# Patient Record
Sex: FEMALE | Race: WHITE | Employment: UNEMPLOYED | URBAN - METROPOLITAN AREA
[De-identification: names, ages, dates, MRNs, and addresses within clinical notes are randomized per-mention and may not be internally consistent; named-entity substitution may affect disease eponyms.]

---

## 2017-01-09 ENCOUNTER — HOSPITAL ENCOUNTER (OUTPATIENT)
Dept: MAMMOGRAPHY | Age: 47
Discharge: HOME | End: 2017-01-09
Attending: FAMILY MEDICINE
Payer: COMMERCIAL

## 2017-01-09 DIAGNOSIS — Z12.31 ENCOUNTER FOR SCREENING MAMMOGRAM FOR BREAST CANCER: ICD-10-CM

## 2017-01-09 PROCEDURE — 77067 SCR MAMMO BI INCL CAD: CPT

## 2017-01-16 ENCOUNTER — OFFICE VISIT (OUTPATIENT)
Dept: FAMILY MEDICINE CLINIC | Facility: CLINIC | Age: 47
End: 2017-01-16

## 2017-01-16 VITALS
BODY MASS INDEX: 34.31 KG/M2 | TEMPERATURE: 97 F | HEIGHT: 64 IN | WEIGHT: 201 LBS | RESPIRATION RATE: 18 BRPM | HEART RATE: 88 BPM | DIASTOLIC BLOOD PRESSURE: 70 MMHG | SYSTOLIC BLOOD PRESSURE: 110 MMHG

## 2017-01-16 DIAGNOSIS — E55.9 VITAMIN D DEFICIENCY: ICD-10-CM

## 2017-01-16 DIAGNOSIS — Z98.84 GASTRIC BYPASS STATUS FOR OBESITY: Primary | ICD-10-CM

## 2017-01-16 DIAGNOSIS — E03.9 HYPOTHYROIDISM, UNSPECIFIED TYPE: ICD-10-CM

## 2017-01-16 DIAGNOSIS — E78.5 HYPERLIPIDEMIA, UNSPECIFIED HYPERLIPIDEMIA TYPE: ICD-10-CM

## 2017-01-16 DIAGNOSIS — E11.9 CONTROLLED TYPE 2 DIABETES MELLITUS WITHOUT COMPLICATION, WITHOUT LONG-TERM CURRENT USE OF INSULIN (HCC): ICD-10-CM

## 2017-01-16 LAB
25-HYDROXYVITAMIN D (TOTAL): 28.5 NG/ML (ref 30–100)
ALBUMIN SERPL-MCNC: 4.1 G/DL (ref 3.5–4.8)
ALP LIVER SERPL-CCNC: 96 U/L (ref 37–98)
ALT SERPL-CCNC: 28 U/L (ref 14–54)
AST SERPL-CCNC: 20 U/L (ref 15–41)
BILIRUB SERPL-MCNC: 0.4 MG/DL (ref 0.1–2)
BUN BLD-MCNC: 17 MG/DL (ref 8–20)
CALCIUM BLD-MCNC: 9.3 MG/DL (ref 8.3–10.3)
CHLORIDE: 106 MMOL/L (ref 101–111)
CHOLEST SMN-MCNC: 163 MG/DL (ref ?–200)
CO2: 30 MMOL/L (ref 22–32)
CREAT BLD-MCNC: 0.8 MG/DL (ref 0.55–1.02)
EST. AVERAGE GLUCOSE BLD GHB EST-MCNC: 120 MG/DL (ref 68–126)
FREE T4: 1.2 NG/DL (ref 0.9–1.8)
GLUCOSE BLD-MCNC: 116 MG/DL (ref 70–99)
HAV AB SERPL IA-ACNC: 524 PG/ML (ref 193–986)
HBA1C MFR BLD HPLC: 5.8 % (ref ?–5.7)
HDLC SERPL-MCNC: 51 MG/DL (ref 45–?)
HDLC SERPL: 3.2 {RATIO} (ref ?–4.44)
HEMOLYSIS: 1
ICTERUS: 1
LDLC SERPL CALC-MCNC: 87 MG/DL (ref ?–130)
LIPEMIA: 1
M PROTEIN MFR SERPL ELPH: 7.6 G/DL (ref 6.1–8.3)
NONHDLC SERPL-MCNC: 112 MG/DL (ref ?–130)
POTASSIUM SERPL-SCNC: 4.5 MMOL/L (ref 3.6–5.1)
SODIUM SERPL-SCNC: 142 MMOL/L (ref 136–144)
TRIGLYCERIDES: 123 MG/DL (ref ?–150)
TSI SER-ACNC: 0.61 MIU/ML (ref 0.35–5.5)
VLDL: 25 MG/DL (ref 5–40)

## 2017-01-16 PROCEDURE — 80053 COMPREHEN METABOLIC PANEL: CPT | Performed by: FAMILY MEDICINE

## 2017-01-16 PROCEDURE — 83036 HEMOGLOBIN GLYCOSYLATED A1C: CPT | Performed by: FAMILY MEDICINE

## 2017-01-16 PROCEDURE — 82306 VITAMIN D 25 HYDROXY: CPT | Performed by: FAMILY MEDICINE

## 2017-01-16 PROCEDURE — 80061 LIPID PANEL: CPT | Performed by: FAMILY MEDICINE

## 2017-01-16 PROCEDURE — 84443 ASSAY THYROID STIM HORMONE: CPT | Performed by: FAMILY MEDICINE

## 2017-01-16 PROCEDURE — 82607 VITAMIN B-12: CPT | Performed by: FAMILY MEDICINE

## 2017-01-16 PROCEDURE — 36415 COLL VENOUS BLD VENIPUNCTURE: CPT | Performed by: FAMILY MEDICINE

## 2017-01-16 PROCEDURE — 99214 OFFICE O/P EST MOD 30 MIN: CPT | Performed by: FAMILY MEDICINE

## 2017-01-16 PROCEDURE — 84439 ASSAY OF FREE THYROXINE: CPT | Performed by: FAMILY MEDICINE

## 2017-01-16 NOTE — PROGRESS NOTES
Lenore Mcgee is a 40year old female. CC:  No chief complaint on file. HPI:  F/u gastric bypass.  She has lost 50 lbs in the past 2 months    Follow up hypothyroid:  Energy: good  Skin changes: no  Palpitations: no  Weight changes:  yes as above mellitus, type 2 ([facility] [address]      type 2, oral meds          Past Surgical History    CHOLECYSTECTOMY      HYSTERECTOMY      Comment MACY-BSO '02--benign    REMOVAL OF KIDNEY STONE      OTHER SURGICAL HISTORY      Comment bladder suspension, R wrist ganglion joy normal nose, pharynx and TM's  NECK: No lymphadenopathy, thyromegaly or masses  CAR: S1, S2 normal, RRR; no S3, no S4; no click; murmur negative  PULM: clear to auscultation B, no accessory muscle use  GI: normal active BS+, soft, nondistended; no HSM; no

## 2017-01-17 ENCOUNTER — TELEPHONE (OUTPATIENT)
Dept: FAMILY MEDICINE CLINIC | Facility: CLINIC | Age: 47
End: 2017-01-17

## 2017-01-21 RX ORDER — LEVOTHYROXINE SODIUM 0.2 MG/1
200 TABLET ORAL
Qty: 90 TABLET | Refills: 1 | Status: SHIPPED | OUTPATIENT
Start: 2017-01-21 | End: 2017-05-20

## 2017-02-13 ENCOUNTER — MED REC SCAN ONLY (OUTPATIENT)
Dept: FAMILY MEDICINE CLINIC | Facility: CLINIC | Age: 47
End: 2017-02-13

## 2017-03-24 RX ORDER — TRAMADOL HYDROCHLORIDE 50 MG/1
TABLET ORAL
Qty: 45 TABLET | Refills: 0 | Status: SHIPPED | OUTPATIENT
Start: 2017-03-24 | End: 2017-05-20

## 2017-03-24 RX ORDER — TRAMADOL HYDROCHLORIDE 50 MG/1
TABLET ORAL
Qty: 45 TABLET | Refills: 0 | Status: CANCELLED | OUTPATIENT
Start: 2017-03-24

## 2017-03-24 NOTE — TELEPHONE ENCOUNTER
Last OV 1/16/17  Last refilled 12/27/16  #45  0 refills    Routed to Dr Nidia Choi as covering provider

## 2017-03-24 NOTE — TELEPHONE ENCOUNTER
From: Marisol Hinton  To:  Zarina Tran DO  Sent: 3/24/2017 2:19 PM CDT  Subject: Medication Renewal Request    Original authorizing provider: DO Marisol Pennington would like a refill of the following medications:  TRAMADOL HCL 50 MG Oral Ta

## 2017-05-01 ENCOUNTER — OFFICE VISIT (OUTPATIENT)
Dept: FAMILY MEDICINE CLINIC | Facility: CLINIC | Age: 47
End: 2017-05-01

## 2017-05-01 VITALS
RESPIRATION RATE: 14 BRPM | WEIGHT: 182.19 LBS | SYSTOLIC BLOOD PRESSURE: 98 MMHG | TEMPERATURE: 97 F | BODY MASS INDEX: 31 KG/M2 | HEART RATE: 66 BPM | DIASTOLIC BLOOD PRESSURE: 70 MMHG

## 2017-05-01 DIAGNOSIS — E55.9 VITAMIN D DEFICIENCY: ICD-10-CM

## 2017-05-01 DIAGNOSIS — Z00.00 WELL ADULT EXAM: Primary | ICD-10-CM

## 2017-05-01 DIAGNOSIS — E66.9 OBESITY, UNSPECIFIED OBESITY SEVERITY, UNSPECIFIED OBESITY TYPE: ICD-10-CM

## 2017-05-01 PROCEDURE — 82306 VITAMIN D 25 HYDROXY: CPT | Performed by: FAMILY MEDICINE

## 2017-05-01 PROCEDURE — 36415 COLL VENOUS BLD VENIPUNCTURE: CPT | Performed by: FAMILY MEDICINE

## 2017-05-01 PROCEDURE — 99396 PREV VISIT EST AGE 40-64: CPT | Performed by: FAMILY MEDICINE

## 2017-05-01 PROCEDURE — 82607 VITAMIN B-12: CPT | Performed by: FAMILY MEDICINE

## 2017-05-01 PROCEDURE — 83036 HEMOGLOBIN GLYCOSYLATED A1C: CPT | Performed by: FAMILY MEDICINE

## 2017-05-01 PROCEDURE — 84590 ASSAY OF VITAMIN A: CPT | Performed by: FAMILY MEDICINE

## 2017-05-01 PROCEDURE — 84439 ASSAY OF FREE THYROXINE: CPT | Performed by: FAMILY MEDICINE

## 2017-05-01 PROCEDURE — 83921 ORGANIC ACID SINGLE QUANT: CPT | Performed by: FAMILY MEDICINE

## 2017-05-01 PROCEDURE — 84630 ASSAY OF ZINC: CPT | Performed by: FAMILY MEDICINE

## 2017-05-01 PROCEDURE — 80050 GENERAL HEALTH PANEL: CPT | Performed by: FAMILY MEDICINE

## 2017-05-01 PROCEDURE — 80061 LIPID PANEL: CPT | Performed by: FAMILY MEDICINE

## 2017-05-01 PROCEDURE — 82728 ASSAY OF FERRITIN: CPT | Performed by: FAMILY MEDICINE

## 2017-05-01 NOTE — PROGRESS NOTES
Santino Chamberlain is a 40year old female.     CC:  Patient presents with:  Physical      HPI:  Patient is here for yearly, wellness exam  Last Lipid: 1/17  Last colonoscopy: 2015  Last Tetanus: 4/16  Last mammo: 1/17  Last Pap: na, s/p hysterectomy    [place] [facility] HISTORY  2013    Comment left eye surgery (muscle repair)    UPPER GI ENDOSCOPY,BIOPSY N/A 3/17/2015    Comment Procedure: ESOPHAGOGASTRODUODENOSCOPY, POSSIBLE BIOPSY, POSSIBLE POLYPECTOMY 29574;  Surgeon: Kai Kingston MD;  Location: [facility] appropriate  SKIN: not examined  BREAST: not examined/not applicable  EXTREMITIES: No clubbing, cyanosis or edema  RECTAL: not examined  GENITAL: not examined  LYMPH: no supraclavicular nodes  MUSCULOSKELETAL: normal ambulation  NEURO: intact; no sensorimo

## 2017-05-05 ENCOUNTER — NURSE ONLY (OUTPATIENT)
Dept: FAMILY MEDICINE CLINIC | Facility: CLINIC | Age: 47
End: 2017-05-05

## 2017-05-05 DIAGNOSIS — E66.01 MORBID OBESITY DUE TO EXCESS CALORIES (HCC): Primary | ICD-10-CM

## 2017-05-05 PROCEDURE — 84425 ASSAY OF VITAMIN B-1: CPT | Performed by: FAMILY MEDICINE

## 2017-05-05 PROCEDURE — 36415 COLL VENOUS BLD VENIPUNCTURE: CPT | Performed by: FAMILY MEDICINE

## 2017-05-05 PROCEDURE — 85014 HEMATOCRIT: CPT | Performed by: FAMILY MEDICINE

## 2017-05-05 PROCEDURE — 82747 ASSAY OF FOLIC ACID RBC: CPT | Performed by: FAMILY MEDICINE

## 2017-05-10 ENCOUNTER — MED REC SCAN ONLY (OUTPATIENT)
Dept: FAMILY MEDICINE CLINIC | Facility: CLINIC | Age: 47
End: 2017-05-10

## 2017-05-20 RX ORDER — TRAMADOL HYDROCHLORIDE 50 MG/1
TABLET ORAL
Qty: 45 TABLET | Refills: 0 | OUTPATIENT
Start: 2017-05-20

## 2017-05-20 NOTE — TELEPHONE ENCOUNTER
From: Brandy Nelson  To: Rodney Miranda MD  Sent: 5/20/2017 9:24 AM CDT  Subject: Medication Renewal Request    Original authorizing provider: MD Brandy Jimenez would like a refill of the following medications:  Levothyroxine Sodium 200

## 2017-05-20 NOTE — TELEPHONE ENCOUNTER
From: German Dick  To: Demetrius Alvarado DO  Sent: 5/20/2017 9:25 AM CDT  Subject: Medication Renewal Request    Original authorizing provider: DO German Mcclelland would like a refill of the following medications:  TraMADol HCl 50 MG Oral

## 2017-05-20 NOTE — TELEPHONE ENCOUNTER
LRF 1/21/17 #90 with 1 refill  LOV 5/1/17      Ref Range 5/1/17  9:31 AM       Free T4 0.9-1.8 ng/dL 1.3     TSH 0.350-5.500 mIU/mL 0.127 (L)

## 2017-05-20 NOTE — TELEPHONE ENCOUNTER
From: Michael Johnson  To: Sunita Curtis DO  Sent: 5/20/2017 9:24 AM CDT  Subject: Medication Renewal Request    Original authorizing provider: Jerris Cranker, DO Everardo Seidel would like a refill of the following medications:  TraMADol HCl 50 MG Oral

## 2017-05-22 RX ORDER — LEVOTHYROXINE SODIUM 0.2 MG/1
200 TABLET ORAL
Qty: 90 TABLET | Refills: 1 | Status: SHIPPED | OUTPATIENT
Start: 2017-05-22 | End: 2017-11-04

## 2017-05-22 RX ORDER — TRAMADOL HYDROCHLORIDE 50 MG/1
TABLET ORAL
Qty: 45 TABLET | Refills: 0 | Status: SHIPPED
Start: 2017-05-22 | End: 2017-06-08

## 2017-05-22 RX ORDER — TRAMADOL HYDROCHLORIDE 50 MG/1
TABLET ORAL
Qty: 45 TABLET | Refills: 0 | OUTPATIENT
Start: 2017-05-22

## 2017-06-08 RX ORDER — TRAMADOL HYDROCHLORIDE 50 MG/1
TABLET ORAL
Qty: 45 TABLET | Refills: 0 | Status: SHIPPED
Start: 2017-06-08 | End: 2017-08-08

## 2017-06-08 NOTE — TELEPHONE ENCOUNTER
From: Barron Alfonso  To: Anthony Alexandre MD  Sent: 6/8/2017 4:27 PM CDT  Subject: Medication Renewal Request    Original authorizing provider: MD Barron Khan would like a refill of the following medications:  TraMADol HCl 50 MG Oral T

## 2017-06-15 ENCOUNTER — PATIENT MESSAGE (OUTPATIENT)
Dept: FAMILY MEDICINE CLINIC | Facility: CLINIC | Age: 47
End: 2017-06-15

## 2017-06-15 RX ORDER — BUTALBITAL, ACETAMINOPHEN AND CAFFEINE 50; 325; 40 MG/1; MG/1; MG/1
1 TABLET ORAL EVERY 6 HOURS PRN
Qty: 5 TABLET | Refills: 0 | Status: SHIPPED | OUTPATIENT
Start: 2017-06-15 | End: 2019-06-19

## 2017-06-15 NOTE — TELEPHONE ENCOUNTER
From: Terrence Mack  To: Phani Grigsby MD  Sent: 6/15/2017 4:29 PM CDT  Subject: Prescription Question    Can u please refill my bubalitol (headache medicine)? Have had a migraine for 2 days. [facility] [place]. Thank you!

## 2017-06-25 ENCOUNTER — PATIENT MESSAGE (OUTPATIENT)
Dept: FAMILY MEDICINE CLINIC | Facility: CLINIC | Age: 47
End: 2017-06-25

## 2017-06-26 NOTE — TELEPHONE ENCOUNTER
From: Santino Chamberlain  To: Beverly Guerra MD  Sent: 6/25/2017 10:10 PM CDT  Subject: Prescription Question    Hey doc! [place] vacation and i forgot my thyroid medicine at home! We won't be home til july 9th. Will i be ok without it?

## 2017-07-19 ENCOUNTER — TELEPHONE (OUTPATIENT)
Dept: FAMILY MEDICINE CLINIC | Facility: CLINIC | Age: 47
End: 2017-07-19

## 2017-07-19 NOTE — TELEPHONE ENCOUNTER
Patient is scheduled for Friday 07/28/17 at 8:30 am for blood work (Nurse Appt). Ordering MD is Dr Nahid Patricio. Patient has order and will drop it off a few days before so that we have it.  Patient states that it is the same blood work that we have drawn before f

## 2017-07-28 ENCOUNTER — NURSE ONLY (OUTPATIENT)
Dept: FAMILY MEDICINE CLINIC | Facility: CLINIC | Age: 47
End: 2017-07-28

## 2017-07-28 ENCOUNTER — PATIENT MESSAGE (OUTPATIENT)
Dept: FAMILY MEDICINE CLINIC | Facility: CLINIC | Age: 47
End: 2017-07-28

## 2017-07-28 DIAGNOSIS — E66.01 MORBID OBESITY (HCC): Primary | ICD-10-CM

## 2017-07-28 LAB
25-HYDROXYVITAMIN D (TOTAL): 27.1 NG/ML (ref 30–100)
ALBUMIN SERPL-MCNC: 3.7 G/DL (ref 3.5–4.8)
ALP LIVER SERPL-CCNC: 72 U/L (ref 39–100)
ALT SERPL-CCNC: 18 U/L (ref 14–54)
AST SERPL-CCNC: 13 U/L (ref 15–41)
BASOPHILS # BLD AUTO: 0.05 X10(3) UL (ref 0–0.1)
BASOPHILS NFR BLD AUTO: 0.6 %
BILIRUB SERPL-MCNC: 0.5 MG/DL (ref 0.1–2)
BUN BLD-MCNC: 13 MG/DL (ref 8–20)
CALCIUM BLD-MCNC: 9 MG/DL (ref 8.3–10.3)
CHLORIDE: 109 MMOL/L (ref 101–111)
CO2: 30 MMOL/L (ref 22–32)
CREAT BLD-MCNC: 0.74 MG/DL (ref 0.55–1.02)
DEPRECATED HBV CORE AB SER IA-ACNC: 172.4 NG/ML (ref 10–291)
EOSINOPHIL # BLD AUTO: 0.12 X10(3) UL (ref 0–0.3)
EOSINOPHIL NFR BLD AUTO: 1.5 %
ERYTHROCYTE [DISTWIDTH] IN BLOOD BY AUTOMATED COUNT: 13.8 % (ref 11.5–16)
GLUCOSE BLD-MCNC: 82 MG/DL (ref 70–99)
HAV AB SERPL IA-ACNC: 409 PG/ML (ref 193–986)
HCT VFR BLD AUTO: 42.7 % (ref 34–50)
HGB BLD-MCNC: 14.2 G/DL (ref 12–16)
IMMATURE GRANULOCYTE COUNT: 0.03 X10(3) UL (ref 0–1)
IMMATURE GRANULOCYTE RATIO %: 0.4 %
LYMPHOCYTES # BLD AUTO: 2.73 X10(3) UL (ref 0.9–4)
LYMPHOCYTES NFR BLD AUTO: 33.1 %
M PROTEIN MFR SERPL ELPH: 6.8 G/DL (ref 6.1–8.3)
MCH RBC QN AUTO: 30 PG (ref 27–33.2)
MCHC RBC AUTO-ENTMCNC: 33.3 G/DL (ref 31–37)
MCV RBC AUTO: 90.1 FL (ref 81–100)
MONOCYTES # BLD AUTO: 0.4 X10(3) UL (ref 0.1–0.6)
MONOCYTES NFR BLD AUTO: 4.9 %
NEUTROPHIL ABS PRELIM: 4.91 X10 (3) UL (ref 1.3–6.7)
NEUTROPHILS # BLD AUTO: 4.91 X10(3) UL (ref 1.3–6.7)
NEUTROPHILS NFR BLD AUTO: 59.5 %
PLATELET # BLD AUTO: 222 10(3)UL (ref 150–450)
POTASSIUM SERPL-SCNC: 4.2 MMOL/L (ref 3.6–5.1)
RBC # BLD AUTO: 4.74 X10(6)UL (ref 3.8–5.1)
RED CELL DISTRIBUTION WIDTH-SD: 45.6 FL (ref 35.1–46.3)
SODIUM SERPL-SCNC: 147 MMOL/L (ref 136–144)
WBC # BLD AUTO: 8.2 X10(3) UL (ref 4–13)

## 2017-07-28 PROCEDURE — 84425 ASSAY OF VITAMIN B-1: CPT | Performed by: FAMILY MEDICINE

## 2017-07-28 PROCEDURE — 84590 ASSAY OF VITAMIN A: CPT | Performed by: FAMILY MEDICINE

## 2017-07-28 PROCEDURE — 82747 ASSAY OF FOLIC ACID RBC: CPT | Performed by: FAMILY MEDICINE

## 2017-07-28 PROCEDURE — 82607 VITAMIN B-12: CPT | Performed by: FAMILY MEDICINE

## 2017-07-28 PROCEDURE — 36415 COLL VENOUS BLD VENIPUNCTURE: CPT | Performed by: FAMILY MEDICINE

## 2017-07-28 PROCEDURE — 82728 ASSAY OF FERRITIN: CPT | Performed by: FAMILY MEDICINE

## 2017-07-28 PROCEDURE — 80053 COMPREHEN METABOLIC PANEL: CPT | Performed by: FAMILY MEDICINE

## 2017-07-28 PROCEDURE — 84630 ASSAY OF ZINC: CPT | Performed by: FAMILY MEDICINE

## 2017-07-28 PROCEDURE — 85025 COMPLETE CBC W/AUTO DIFF WBC: CPT | Performed by: FAMILY MEDICINE

## 2017-07-28 PROCEDURE — 82306 VITAMIN D 25 HYDROXY: CPT | Performed by: FAMILY MEDICINE

## 2017-07-28 PROCEDURE — 83921 ORGANIC ACID SINGLE QUANT: CPT | Performed by: FAMILY MEDICINE

## 2017-07-28 NOTE — TELEPHONE ENCOUNTER
From: Yolanda Das  To: Jearlean Lefort, MD  Sent: 7/28/2017 2:59 PM CDT  Subject: Non-Urgent Medical Question    Hey doc! My sister was just diagnosed with hereditary cardiomyopathy. Is this something I need to get checked for?

## 2017-07-28 NOTE — PROGRESS NOTES
Blood work drawn for [facility]  3 purple  2 gold  2 green   1 dark blue   22 g - left antecubital   Fax results to [phone]

## 2017-07-29 LAB — HEMATOCRIT (CLIENT SUPPLIED): 35.2 %

## 2017-07-30 LAB — MMA: 0.2 UMOL/L

## 2017-07-31 LAB
INTERPRETATION VIT A, SER/PLA: NORMAL
RETINYL PALMITATE: 0.02 MG/L
VITAMIN A (RETINOL): 0.54 MG/L
VITAMIN B1 (THIAMINE), WHOLE B: 105 NMOL/L
ZINC: 91 UG/DL

## 2017-08-08 RX ORDER — TRAMADOL HYDROCHLORIDE 50 MG/1
TABLET ORAL
Qty: 45 TABLET | Refills: 1 | Status: SHIPPED
Start: 2017-08-08 | End: 2018-06-14 | Stop reason: ALTCHOICE

## 2017-08-08 NOTE — TELEPHONE ENCOUNTER
From: Kolby Mckeon  Sent: 8/8/2017 4:30 AM CDT  Subject: Medication Renewal Request    Estiven Valdez would like a refill of the following medications:  TraMADol HCl 50 MG Oral Tab Dorsey Cooks, MD]    Preferred pharmacy: [place] [phone] -

## 2017-11-03 ENCOUNTER — PATIENT MESSAGE (OUTPATIENT)
Dept: FAMILY MEDICINE CLINIC | Facility: CLINIC | Age: 47
End: 2017-11-03

## 2017-11-04 NOTE — TELEPHONE ENCOUNTER
LRF 5/22/17 #90 with 1 refill  LOV  5/1/17  Last TSH 5/2/17 0.127- return in 1 year for wellness and labs

## 2017-11-04 NOTE — TELEPHONE ENCOUNTER
From: Lamar Smiley  To: Dionna Vigil MD  Sent: 11/3/2017 7:20 PM CDT  Subject: Prescription Question    Hi doc! I [facility]. Please and thanks!

## 2017-11-06 RX ORDER — LEVOTHYROXINE SODIUM 0.2 MG/1
200 TABLET ORAL
Qty: 90 TABLET | Refills: 2 | Status: SHIPPED | OUTPATIENT
Start: 2017-11-06 | End: 2017-11-07

## 2017-11-07 ENCOUNTER — TELEPHONE (OUTPATIENT)
Dept: FAMILY MEDICINE CLINIC | Facility: CLINIC | Age: 47
End: 2017-11-07

## 2017-11-07 RX ORDER — LEVOTHYROXINE SODIUM 0.2 MG/1
200 TABLET ORAL
Qty: 90 TABLET | Refills: 2 | Status: SHIPPED | OUTPATIENT
Start: 2017-11-07 | End: 2018-04-16

## 2017-11-17 ENCOUNTER — PATIENT MESSAGE (OUTPATIENT)
Dept: FAMILY MEDICINE CLINIC | Facility: CLINIC | Age: 47
End: 2017-11-17

## 2017-12-08 ENCOUNTER — TELEPHONE (OUTPATIENT)
Dept: FAMILY MEDICINE CLINIC | Facility: CLINIC | Age: 47
End: 2017-12-08

## 2017-12-08 NOTE — TELEPHONE ENCOUNTER
PT ADV THAT SHE HAD DM EYE EXAM ABOUT A MONTH AGO AT [facility]--WILL CALL AND HAVE THEM FAX OVER EYE EXAM

## 2017-12-11 ENCOUNTER — OFFICE VISIT (OUTPATIENT)
Dept: FAMILY MEDICINE CLINIC | Facility: CLINIC | Age: 47
End: 2017-12-11

## 2017-12-11 ENCOUNTER — TELEPHONE (OUTPATIENT)
Dept: FAMILY MEDICINE CLINIC | Facility: CLINIC | Age: 47
End: 2017-12-11

## 2017-12-11 VITALS
BODY MASS INDEX: 29.19 KG/M2 | DIASTOLIC BLOOD PRESSURE: 70 MMHG | WEIGHT: 171 LBS | SYSTOLIC BLOOD PRESSURE: 102 MMHG | RESPIRATION RATE: 10 BRPM | HEIGHT: 64 IN | TEMPERATURE: 97 F | HEART RATE: 60 BPM

## 2017-12-11 DIAGNOSIS — Z12.31 ENCOUNTER FOR SCREENING MAMMOGRAM FOR BREAST CANCER: ICD-10-CM

## 2017-12-11 DIAGNOSIS — L03.116 CELLULITIS OF LEFT LOWER EXTREMITY: Primary | ICD-10-CM

## 2017-12-11 PROCEDURE — 99214 OFFICE O/P EST MOD 30 MIN: CPT | Performed by: FAMILY MEDICINE

## 2017-12-11 RX ORDER — AMOXICILLIN AND CLAVULANATE POTASSIUM 500; 125 MG/1; MG/1
TABLET, FILM COATED ORAL
Qty: 20 TABLET | Refills: 0 | Status: SHIPPED | OUTPATIENT
Start: 2017-12-11 | End: 2017-12-21

## 2017-12-11 NOTE — TELEPHONE ENCOUNTER
Left message for patient regarding appointment.    Future Appointments  Date Time Provider Alonso Stroud   12/11/2017 4:00 PM Beverly Guerra MD [facility] Elin Virgen

## 2017-12-11 NOTE — PROGRESS NOTES
Necia Koyanagi is a 56year old female. CC:  Patient presents with:  Lesion: per pt       HPI:  L upper leg with a painful, red sore for about 2 weeks. She has tried to pop it with only minimal return of purulent fluid. No fevers or malaise.   Allergie Elena Kidd MD;  Location: [facility]   Family History   Problem Relation Age of Onset   • Hypertension Mother    • Diabetes Mother    • Other[other] Damon Poncho Mother    • Cancer Mother      Lung   • Hypertension Brother    • Thyroid Disor for screening mammogram for breast cancer  Await results   - [facility] SCREENING BILAT (CPT=77067); Future      Orders for this visit:  No orders of the defined types were placed in this encounter.       [facility] SCREENING BILAT (CPT=77067)    Meds & Refills for this

## 2017-12-11 NOTE — TELEPHONE ENCOUNTER
PT ADV THAT SHE HAS BEEN GETTING LEG SORES WHERE PT HAD LOST WEIGHT AND HAS LOOSE SKIN RUBBING. PT THINKS SHE HAS A BOIL.  WOULD LIKE TO BE SEEN TODAY IF POSSIBLE    PLEASE LEAVE MSG-PT IS  W. [facility]

## 2017-12-11 NOTE — TELEPHONE ENCOUNTER
Phone call from patient. States that on  Top of left thigh has a \"sore\" where her Sandra Garland lays on her leg\". States it is the size of a quarter, reddened, warm to the touch. Raised. Has had for 1-2 weeks, but has gotten worse over the last 2 days.   Fe

## 2017-12-13 ENCOUNTER — TELEPHONE (OUTPATIENT)
Dept: FAMILY MEDICINE CLINIC | Facility: CLINIC | Age: 47
End: 2017-12-13

## 2017-12-15 ENCOUNTER — OFFICE VISIT (OUTPATIENT)
Dept: FAMILY MEDICINE CLINIC | Facility: CLINIC | Age: 47
End: 2017-12-15

## 2017-12-15 VITALS
RESPIRATION RATE: 12 BRPM | TEMPERATURE: 99 F | HEART RATE: 66 BPM | DIASTOLIC BLOOD PRESSURE: 74 MMHG | BODY MASS INDEX: 29 KG/M2 | WEIGHT: 169 LBS | SYSTOLIC BLOOD PRESSURE: 128 MMHG

## 2017-12-15 DIAGNOSIS — L03.116 CELLULITIS OF LEFT LOWER EXTREMITY: Primary | ICD-10-CM

## 2017-12-15 PROCEDURE — 99213 OFFICE O/P EST LOW 20 MIN: CPT | Performed by: FAMILY MEDICINE

## 2017-12-15 NOTE — PROGRESS NOTES
Arleen Milligan is a 56year old female. CC:  No chief complaint on file. HPI:  F/u L upper leg cellulitis. Taking Augmentin as directed. The lesions is better. It did drain a few days ago.  She has the start of similar lesions on R groin and L elb Comment: Procedure: ESOPHAGOGASTRODUODENOSCOPY,                POSSIBLE BIOPSY, POSSIBLE POLYPECTOMY 73720;                 Surgeon: Annella Homans, MD;  Location: [facility]   Family History   Problem Relation Age of Onset   • Hypertension M.D.

## 2017-12-21 ENCOUNTER — OFFICE VISIT (OUTPATIENT)
Dept: FAMILY MEDICINE CLINIC | Facility: CLINIC | Age: 47
End: 2017-12-21

## 2017-12-21 VITALS
RESPIRATION RATE: 16 BRPM | SYSTOLIC BLOOD PRESSURE: 118 MMHG | TEMPERATURE: 97 F | HEART RATE: 72 BPM | DIASTOLIC BLOOD PRESSURE: 70 MMHG | BODY MASS INDEX: 29 KG/M2 | WEIGHT: 168 LBS

## 2017-12-21 DIAGNOSIS — J06.9 VIRAL UPPER RESPIRATORY TRACT INFECTION: ICD-10-CM

## 2017-12-21 DIAGNOSIS — L03.116 CELLULITIS OF LEFT LOWER EXTREMITY: Primary | ICD-10-CM

## 2017-12-21 PROCEDURE — 99213 OFFICE O/P EST LOW 20 MIN: CPT | Performed by: FAMILY MEDICINE

## 2017-12-21 NOTE — PROGRESS NOTES
Haley Vaughn is a 56year old female. CC:  Patient presents with: Follow - Up: on sore on leg per pt      HPI:  F/u L groin region cellulitis. She is still getting some d/c from the area. She is taking the antibiotic as directed w/o issue.  Her ener shoulder SLAP repair  2013: OTHER SURGICAL HISTORY      Comment: left eye surgery (muscle repair)  No date: REMOVAL OF KIDNEY STONE  3/17/2015: UPPER GI ENDOSCOPY,BIOPSY N/A      Comment: Procedure: ESOPHAGOGASTRODUODENOSCOPY,                POSSIBLE BIOPS induration  BREAST: not examined/not applicable  EXTREMITIES: No clubbing, cyanosis or edema  RECTAL: not examined  GENITAL: not examined  LYMPH: no supraclavicular nodes  MUSCULOSKELETAL: normal ambulation  NEURO: not examined     ASSESSMENT AND PLAN    1

## 2018-01-02 ENCOUNTER — PATIENT MESSAGE (OUTPATIENT)
Dept: FAMILY MEDICINE CLINIC | Facility: CLINIC | Age: 48
End: 2018-01-02

## 2018-01-03 NOTE — TELEPHONE ENCOUNTER
From: Lola Garland  To: Pedro Holland MD  Sent: 1/2/2018 3:39 PM CST  Subject: Non-Urgent Medical Question    Hi doc! Called and scheduled Daisha Gage his stress test and me my mammogram. They asked about scheduling his MRI for his back.  Has that been [facility]

## 2018-01-24 ENCOUNTER — HOSPITAL ENCOUNTER (OUTPATIENT)
Dept: MAMMOGRAPHY | Age: 48
Discharge: HOME | End: 2018-01-24
Attending: FAMILY MEDICINE
Payer: COMMERCIAL

## 2018-01-24 DIAGNOSIS — Z12.31 ENCOUNTER FOR SCREENING MAMMOGRAM FOR BREAST CANCER: ICD-10-CM

## 2018-01-24 PROCEDURE — 77067 SCR MAMMO BI INCL CAD: CPT | Performed by: FAMILY MEDICINE

## 2018-04-16 ENCOUNTER — PATIENT MESSAGE (OUTPATIENT)
Dept: FAMILY MEDICINE CLINIC | Facility: CLINIC | Age: 48
End: 2018-04-16

## 2018-04-16 RX ORDER — LEVOTHYROXINE SODIUM 0.2 MG/1
200 TABLET ORAL
Qty: 90 TABLET | Refills: 2 | Status: SHIPPED
Start: 2018-04-16 | End: 2019-07-30

## 2018-04-16 NOTE — TELEPHONE ENCOUNTER
Last refilled on 11/7/17 for # 90 with 2 refills  Last TSH labs 0.127  Last seen on 12/21/17  No future appointments. Thank you.

## 2018-04-16 NOTE — TELEPHONE ENCOUNTER
From: Milvia Fry  To: Sarah Robin MD  Sent: 4/16/2018 2:38 PM CDT  Subject: Other    Hi doc! I went to see a plastic surgeon about my excess skin on my stomach. She needs your notes on the boil you treated on my upper thigh.  Her name is Nicole Villaseñor

## 2018-04-16 NOTE — TELEPHONE ENCOUNTER
From: Micha Barrett  Sent: 4/16/2018 2:39 PM CDT  Subject: Medication Renewal Request    Alonzo Valdez would like a refill of the following medications:     Levothyroxine Sodium 200 MCG Oral Tab Azar Bowers MD]    Preferred pharmacy: [facility]

## 2018-04-27 ENCOUNTER — TELEPHONE (OUTPATIENT)
Dept: FAMILY MEDICINE CLINIC | Facility: CLINIC | Age: 48
End: 2018-04-27

## 2018-04-27 ENCOUNTER — OFFICE VISIT (OUTPATIENT)
Dept: FAMILY MEDICINE CLINIC | Facility: CLINIC | Age: 48
End: 2018-04-27

## 2018-04-27 VITALS
DIASTOLIC BLOOD PRESSURE: 84 MMHG | HEIGHT: 64 IN | WEIGHT: 177 LBS | HEART RATE: 57 BPM | OXYGEN SATURATION: 98 % | RESPIRATION RATE: 16 BRPM | BODY MASS INDEX: 30.22 KG/M2 | SYSTOLIC BLOOD PRESSURE: 120 MMHG | TEMPERATURE: 97 F

## 2018-04-27 DIAGNOSIS — J31.0 PURULENT RHINITIS: Primary | ICD-10-CM

## 2018-04-27 DIAGNOSIS — E11.9 CONTROLLED TYPE 2 DIABETES MELLITUS WITHOUT COMPLICATION, WITHOUT LONG-TERM CURRENT USE OF INSULIN (HCC): ICD-10-CM

## 2018-04-27 DIAGNOSIS — R05.8 PRODUCTIVE COUGH: ICD-10-CM

## 2018-04-27 DIAGNOSIS — E03.9 HYPOTHYROIDISM, UNSPECIFIED TYPE: ICD-10-CM

## 2018-04-27 DIAGNOSIS — E66.9 OBESITY (BMI 30-39.9): ICD-10-CM

## 2018-04-27 PROCEDURE — 84443 ASSAY THYROID STIM HORMONE: CPT | Performed by: FAMILY MEDICINE

## 2018-04-27 PROCEDURE — 82043 UR ALBUMIN QUANTITATIVE: CPT | Performed by: FAMILY MEDICINE

## 2018-04-27 PROCEDURE — 84439 ASSAY OF FREE THYROXINE: CPT | Performed by: FAMILY MEDICINE

## 2018-04-27 PROCEDURE — 82570 ASSAY OF URINE CREATININE: CPT | Performed by: FAMILY MEDICINE

## 2018-04-27 PROCEDURE — 36415 COLL VENOUS BLD VENIPUNCTURE: CPT | Performed by: FAMILY MEDICINE

## 2018-04-27 PROCEDURE — 99214 OFFICE O/P EST MOD 30 MIN: CPT | Performed by: FAMILY MEDICINE

## 2018-04-27 PROCEDURE — 83036 HEMOGLOBIN GLYCOSYLATED A1C: CPT | Performed by: FAMILY MEDICINE

## 2018-04-27 RX ORDER — PHENTERMINE HYDROCHLORIDE 37.5 MG/1
37.5 TABLET ORAL
Qty: 30 TABLET | Refills: 1 | Status: SHIPPED
Start: 2018-04-27 | End: 2018-04-27

## 2018-04-27 RX ORDER — PHENTERMINE HYDROCHLORIDE 37.5 MG/1
37.5 TABLET ORAL
Qty: 30 TABLET | Refills: 1 | Status: SHIPPED
Start: 2018-04-27 | End: 2018-06-14 | Stop reason: ALTCHOICE

## 2018-04-27 RX ORDER — AMOXICILLIN AND CLAVULANATE POTASSIUM 500; 125 MG/1; MG/1
TABLET, FILM COATED ORAL
Qty: 20 TABLET | Refills: 0 | Status: SHIPPED | OUTPATIENT
Start: 2018-04-27 | End: 2018-05-07

## 2018-04-27 RX ORDER — CODEINE PHOSPHATE AND GUAIFENESIN 10; 100 MG/5ML; MG/5ML
5 SOLUTION ORAL 3 TIMES DAILY PRN
Qty: 100 ML | Refills: 0 | Status: SHIPPED
Start: 2018-04-27 | End: 2018-06-14 | Stop reason: ALTCHOICE

## 2018-04-27 NOTE — PROGRESS NOTES
German Dick is a 56year old female. CC:  Patient presents with:  Sinus Problem: per pt      HPI:  The patient has primary complaint of nasal congestion and rhinorrhea that is thick and green for  1 week.  Associated symptoms include facial pain   a spondylosis    • GALINA on CPAP       Past Surgical History:  No date: CHOLECYSTECTOMY  3/17/2015: COLONOSCOPY,BIOPSY N/A      Comment: Procedure: COLONOSCOPY, POSSIBLE BIOPSY,                POSSIBLE POLYPECTOMY 76238;  Surgeon: Joann Vasquez MD M.D.    Physical Exam:  GEN: well developed, well nourished, in no apparent distress  EYE: B conjunctiva and lids normal  HENT: Nasal mucosa is boggy with discolored discharge, Post nasal drip and cobblestoning present on the posterior pharynx, B pinnas, e Sig: Take 5 mL by mouth 3 (three) times daily as needed for cough. Phentermine HCl (ADIPEX-P) 37.5 MG Oral Tab 30 tablet 1      Sig: Take 1 tablet (37.5 mg total) by mouth every morning before breakfast.             No Follow-up on file.

## 2018-04-27 NOTE — TELEPHONE ENCOUNTER
Phone call to [employer]- advised that the phentermine has been approved. Approved 3 28 2018 thru 7 26 2018.   Case ID# [id number]  Rx # [id number]

## 2018-06-14 ENCOUNTER — OFFICE VISIT (OUTPATIENT)
Dept: FAMILY MEDICINE CLINIC | Facility: CLINIC | Age: 48
End: 2018-06-14

## 2018-06-14 VITALS
RESPIRATION RATE: 16 BRPM | BODY MASS INDEX: 29.4 KG/M2 | SYSTOLIC BLOOD PRESSURE: 110 MMHG | TEMPERATURE: 97 F | HEIGHT: 64 IN | DIASTOLIC BLOOD PRESSURE: 76 MMHG | WEIGHT: 172.19 LBS | HEART RATE: 72 BPM

## 2018-06-14 DIAGNOSIS — Z00.00 WELL ADULT EXAM: Primary | ICD-10-CM

## 2018-06-14 DIAGNOSIS — E03.9 HYPOTHYROIDISM, UNSPECIFIED TYPE: ICD-10-CM

## 2018-06-14 DIAGNOSIS — Z63.79 STRESS DUE TO ILLNESS OF FAMILY MEMBER: ICD-10-CM

## 2018-06-14 PROCEDURE — 99396 PREV VISIT EST AGE 40-64: CPT | Performed by: FAMILY MEDICINE

## 2018-06-14 NOTE — PROGRESS NOTES
Brandy Nelson is a 53year old female.     CC:  Patient presents with:  Physical: per pt      HPI:  Patient is here for yearly, wellness exam   Last Lipid: 5/17   Last colonoscopy: na       Immunization History   Administered            Date(s) Administe bladder suspension, R wrist ganglion removal,                L ovarian cystectomy  No date: OTHER SURGICAL HISTORY      Comment: R shoulder SLAP repair  2013: OTHER SURGICAL HISTORY      Comment: left eye surgery (muscle repair)  No date: REMOVAL OF KIDNEY masses  CAR: S1, S2 normal, RRR; no S3, no S4; no click; murmur negative  PULM: clear to auscultation B, no accessory muscle use  GI: normal active BS+, soft, nondistended; no HSM; no masses; no bruits; no masses; nontender, no G/R/R   PSYCH: alert and fatemeh

## 2018-06-20 ENCOUNTER — NURSE ONLY (OUTPATIENT)
Dept: FAMILY MEDICINE CLINIC | Facility: CLINIC | Age: 48
End: 2018-06-20

## 2018-06-20 DIAGNOSIS — E03.9 HYPOTHYROIDISM, UNSPECIFIED TYPE: ICD-10-CM

## 2018-06-20 DIAGNOSIS — Z00.00 WELL ADULT EXAM: ICD-10-CM

## 2018-06-20 PROCEDURE — 84439 ASSAY OF FREE THYROXINE: CPT | Performed by: FAMILY MEDICINE

## 2018-06-20 PROCEDURE — 80050 GENERAL HEALTH PANEL: CPT | Performed by: FAMILY MEDICINE

## 2018-06-20 PROCEDURE — 80061 LIPID PANEL: CPT | Performed by: FAMILY MEDICINE

## 2018-06-20 PROCEDURE — 36415 COLL VENOUS BLD VENIPUNCTURE: CPT | Performed by: FAMILY MEDICINE

## 2018-06-20 NOTE — PROGRESS NOTES
Antoine Cruz presents today for nurse visit. 1 light green, 1 lavender drawn from left ac area with 1 attempt with straight needle. Left office in stable condition.

## 2018-06-21 ENCOUNTER — TELEPHONE (OUTPATIENT)
Dept: FAMILY MEDICINE CLINIC | Facility: CLINIC | Age: 48
End: 2018-06-21

## 2018-06-21 RX ORDER — AZITHROMYCIN 250 MG/1
TABLET, FILM COATED ORAL
Qty: 6 TABLET | Refills: 0 | Status: SHIPPED | OUTPATIENT
Start: 2018-06-21 | End: 2018-06-26

## 2018-06-21 RX ORDER — ALBUTEROL SULFATE 90 UG/1
2 AEROSOL, METERED RESPIRATORY (INHALATION) EVERY 4 HOURS PRN
Qty: 1 INHALER | Refills: 0 | Status: SHIPPED | OUTPATIENT
Start: 2018-06-21 | End: 2019-06-19

## 2018-06-21 NOTE — TELEPHONE ENCOUNTER
Pt woke up this morning coughing up \"thick Booger crap\" She leaves for vacation later today and doesn't have time to come in and see TJ. She is wheezing a little. She would like SHANNEN to call in something so she can kick the bug fast while on vacation.  Sh

## 2018-06-21 NOTE — TELEPHONE ENCOUNTER
Call from patient. Stated last night she started to get a itchy throat, runny nose and wheezing. States she is a little short of breath, but doesn't have a fever. States she thinks this is bronchitis.  She is leaving this afternoon for [place] and doesn't

## 2018-06-21 NOTE — TELEPHONE ENCOUNTER
Fax received from [facility] stating that patient has macrolide allergy. Left message for patient to call back to advise on allergy.  Patient has been on zithromax in the past.

## 2018-06-21 NOTE — TELEPHONE ENCOUNTER
Call back from patient. States that she is not allergic to macrolides. Call to [facility] and spoke to pharmacy staff. Advised that patient states she is not allergic to macrolides. Pharmacy will fill script. Dr Sherrie Rodriguez aware.

## 2019-01-17 ENCOUNTER — PATIENT OUTREACH (OUTPATIENT)
Dept: FAMILY MEDICINE CLINIC | Facility: CLINIC | Age: 49
End: 2019-01-17

## 2019-02-11 ENCOUNTER — HOSPITAL ENCOUNTER (OUTPATIENT)
Dept: MAMMOGRAPHY | Age: 49
Discharge: HOME | End: 2019-02-11
Attending: FAMILY MEDICINE
Payer: COMMERCIAL

## 2019-02-11 DIAGNOSIS — Z12.31 BREAST CANCER SCREENING BY MAMMOGRAM: ICD-10-CM

## 2019-02-11 PROCEDURE — 77067 SCR MAMMO BI INCL CAD: CPT | Performed by: FAMILY MEDICINE

## 2019-04-05 ENCOUNTER — TELEPHONE (OUTPATIENT)
Dept: FAMILY MEDICINE CLINIC | Facility: CLINIC | Age: 49
End: 2019-04-05

## 2019-04-05 DIAGNOSIS — K90.9 INTESTINAL MALABSORPTION, UNSPECIFIED TYPE: Primary | ICD-10-CM

## 2019-04-05 NOTE — TELEPHONE ENCOUNTER
Orders entered -  Phone call to [facility] [phone]-  Xck question regarding the Iron, Transferrin with Calculated TIBC- office does not do that test. What tests would they like to have done?

## 2019-04-05 NOTE — TELEPHONE ENCOUNTER
Rashida from [facility] called the office back- she was advised that the Iron, Transferrin with Calculated TIBC can not be performed in the office. Advised that the office can do a Iron- TIBC and do the transferrin separately.  Be

## 2019-04-08 ENCOUNTER — NURSE ONLY (OUTPATIENT)
Dept: FAMILY MEDICINE CLINIC | Facility: CLINIC | Age: 49
End: 2019-04-08
Payer: COMMERCIAL

## 2019-04-08 DIAGNOSIS — K90.9 INTESTINAL MALABSORPTION, UNSPECIFIED TYPE: ICD-10-CM

## 2019-04-08 DIAGNOSIS — E78.5 HYPERLIPIDEMIA, UNSPECIFIED HYPERLIPIDEMIA TYPE: ICD-10-CM

## 2019-04-08 DIAGNOSIS — E03.9 ACQUIRED HYPOTHYROIDISM: ICD-10-CM

## 2019-04-08 PROCEDURE — 80061 LIPID PANEL: CPT | Performed by: FAMILY MEDICINE

## 2019-04-08 PROCEDURE — 82746 ASSAY OF FOLIC ACID SERUM: CPT | Performed by: FAMILY MEDICINE

## 2019-04-08 PROCEDURE — 36415 COLL VENOUS BLD VENIPUNCTURE: CPT | Performed by: FAMILY MEDICINE

## 2019-04-08 PROCEDURE — 84439 ASSAY OF FREE THYROXINE: CPT | Performed by: FAMILY MEDICINE

## 2019-04-08 PROCEDURE — 83970 ASSAY OF PARATHORMONE: CPT | Performed by: FAMILY MEDICINE

## 2019-04-08 PROCEDURE — 84100 ASSAY OF PHOSPHORUS: CPT | Performed by: FAMILY MEDICINE

## 2019-04-08 PROCEDURE — 82306 VITAMIN D 25 HYDROXY: CPT | Performed by: FAMILY MEDICINE

## 2019-04-08 PROCEDURE — 84134 ASSAY OF PREALBUMIN: CPT | Performed by: FAMILY MEDICINE

## 2019-04-08 PROCEDURE — 82607 VITAMIN B-12: CPT | Performed by: FAMILY MEDICINE

## 2019-04-08 PROCEDURE — 83540 ASSAY OF IRON: CPT | Performed by: FAMILY MEDICINE

## 2019-04-08 PROCEDURE — 84590 ASSAY OF VITAMIN A: CPT | Performed by: FAMILY MEDICINE

## 2019-04-08 PROCEDURE — 80053 COMPREHEN METABOLIC PANEL: CPT | Performed by: FAMILY MEDICINE

## 2019-04-08 PROCEDURE — 83550 IRON BINDING TEST: CPT | Performed by: FAMILY MEDICINE

## 2019-04-08 PROCEDURE — 84443 ASSAY THYROID STIM HORMONE: CPT | Performed by: FAMILY MEDICINE

## 2019-04-08 NOTE — PROGRESS NOTES
1 gold and  3 mint tubes collected from L AC using straight needle and 1 attempt    Pt tolerated and was sent home in  Stable condition    Lab results to go to bariatric doctor- see Mychart message 4/5/19

## 2019-04-09 DIAGNOSIS — K90.9 INTESTINAL MALABSORPTION, UNSPECIFIED TYPE: Primary | ICD-10-CM

## 2019-04-09 RX ORDER — SIMVASTATIN 20 MG
20 TABLET ORAL NIGHTLY
Qty: 90 TABLET | Refills: 0 | Status: SHIPPED | OUTPATIENT
Start: 2019-04-09 | End: 2019-07-03

## 2019-04-09 NOTE — PROGRESS NOTES
Results to be faxed to Dr Janice Lopez at [facility] [place] [facility] at [phone]

## 2019-04-17 ENCOUNTER — PATIENT MESSAGE (OUTPATIENT)
Dept: FAMILY MEDICINE CLINIC | Facility: CLINIC | Age: 49
End: 2019-04-17

## 2019-04-17 ENCOUNTER — TELEPHONE (OUTPATIENT)
Dept: FAMILY MEDICINE CLINIC | Facility: CLINIC | Age: 49
End: 2019-04-17

## 2019-04-17 NOTE — TELEPHONE ENCOUNTER
From: Jami Monk  To: Briana Simental MD  Sent: 4/17/2019 9:32 AM CDT  Subject: Other    Hi doc. Can you give me a call? Got a few questions and it's a bit complicated.  [phone]

## 2019-04-17 NOTE — TELEPHONE ENCOUNTER
Patient reached out to Dr. Anthony Singh via my-chart and asked for a call back. Phone call to patient.  She states that her daughter is in stage 4 kidney disease and will be starting dialysis and will then be placed on the transplant list. Patient would like t

## 2019-06-19 ENCOUNTER — OFFICE VISIT (OUTPATIENT)
Dept: FAMILY MEDICINE CLINIC | Facility: CLINIC | Age: 49
End: 2019-06-19
Payer: COMMERCIAL

## 2019-06-19 VITALS
HEIGHT: 64 IN | HEART RATE: 64 BPM | WEIGHT: 189 LBS | TEMPERATURE: 97 F | RESPIRATION RATE: 16 BRPM | BODY MASS INDEX: 32.27 KG/M2 | DIASTOLIC BLOOD PRESSURE: 80 MMHG | SYSTOLIC BLOOD PRESSURE: 120 MMHG

## 2019-06-19 DIAGNOSIS — Z00.00 WELL ADULT EXAM: Primary | ICD-10-CM

## 2019-06-19 DIAGNOSIS — E03.9 ACQUIRED HYPOTHYROIDISM: ICD-10-CM

## 2019-06-19 DIAGNOSIS — E78.5 HYPERLIPIDEMIA, UNSPECIFIED HYPERLIPIDEMIA TYPE: ICD-10-CM

## 2019-06-19 DIAGNOSIS — Z72.0 TOBACCO USE: ICD-10-CM

## 2019-06-19 PROCEDURE — 36415 COLL VENOUS BLD VENIPUNCTURE: CPT | Performed by: FAMILY MEDICINE

## 2019-06-19 PROCEDURE — 80061 LIPID PANEL: CPT | Performed by: FAMILY MEDICINE

## 2019-06-19 PROCEDURE — 99396 PREV VISIT EST AGE 40-64: CPT | Performed by: FAMILY MEDICINE

## 2019-06-19 PROCEDURE — 80053 COMPREHEN METABOLIC PANEL: CPT | Performed by: FAMILY MEDICINE

## 2019-06-19 RX ORDER — NICOTINE 21 MG/24HR
1 PATCH, TRANSDERMAL 24 HOURS TRANSDERMAL EVERY 24 HOURS
Qty: 7 PATCH | Refills: 0 | Status: SHIPPED | OUTPATIENT
Start: 2019-06-26 | End: 2019-07-03

## 2019-06-19 RX ORDER — PHENTERMINE HYDROCHLORIDE 37.5 MG/1
37.5 TABLET ORAL
COMMUNITY
Start: 2019-06-19 | End: 2020-07-15

## 2019-06-19 RX ORDER — NICOTINE 21 MG/24HR
1 PATCH, TRANSDERMAL 24 HOURS TRANSDERMAL EVERY 24 HOURS
Qty: 7 PATCH | Refills: 0 | Status: SHIPPED | OUTPATIENT
Start: 2019-06-19 | End: 2019-06-26

## 2019-06-19 NOTE — PROGRESS NOTES
Nohemi Owens is a 51year old female.     CC:  Patient presents with:  Physical: per pt      HPI:  Patient is here for yearly, wellness exam  Last Lipid:  Lab Results   Component Value Date    CHOLEST 280 (H) 04/08/2019    TRIG 198 (H) 04/08/2019    HDL due to dumping syndrome   • History of kidney stones    • History of polycystic ovarian disease    • Hyperlipidemia    • Hypothyroid    • Irritable bowel    • Lumbar spondylosis    • GALINA on CPAP       Past Surgical History:   Procedure Laterality Date stable  Cardiovascular/Pulses: Denies palpitations, tachycardia, irregular heart beat, chest pain  Respiratory: Denies cough, dyspnea, dyspnea on exertion  GI: Denies hematochezia, melena   (female): Denies hematuria    Vitals: /80   Pulse 64   Tem Status: Future          Number of Occurrences: 1          Standing Expiration Date: 6/19/2020      Comp Metabolic Panel (14) [E]          Standing Status: Future          Number of Occurrences: 1          Standing Expiration Date: 6/19/2020      *Josy

## 2019-07-03 RX ORDER — SIMVASTATIN 20 MG
20 TABLET ORAL NIGHTLY
Qty: 90 TABLET | Refills: 3 | Status: SHIPPED | OUTPATIENT
Start: 2019-07-03 | End: 2020-09-15

## 2019-07-03 NOTE — TELEPHONE ENCOUNTER
Patient was advised at original phone call that the prescription would be sent. She would be contacted if any additional questions.

## 2019-10-21 ENCOUNTER — TELEPHONE (OUTPATIENT)
Dept: FAMILY MEDICINE CLINIC | Facility: CLINIC | Age: 49
End: 2019-10-21

## 2019-10-21 ENCOUNTER — OFFICE VISIT (OUTPATIENT)
Dept: FAMILY MEDICINE CLINIC | Facility: CLINIC | Age: 49
End: 2019-10-21
Payer: COMMERCIAL

## 2019-10-21 VITALS
SYSTOLIC BLOOD PRESSURE: 130 MMHG | TEMPERATURE: 96 F | WEIGHT: 196 LBS | BODY MASS INDEX: 34 KG/M2 | DIASTOLIC BLOOD PRESSURE: 82 MMHG

## 2019-10-21 DIAGNOSIS — R09.89 ABNORMAL LUNG SOUNDS: ICD-10-CM

## 2019-10-21 DIAGNOSIS — R06.2 WHEEZING: ICD-10-CM

## 2019-10-21 DIAGNOSIS — R05.8 PRODUCTIVE COUGH: Primary | ICD-10-CM

## 2019-10-21 PROCEDURE — 99214 OFFICE O/P EST MOD 30 MIN: CPT | Performed by: FAMILY MEDICINE

## 2019-10-21 RX ORDER — PREDNISONE 20 MG/1
TABLET ORAL
Qty: 18 TABLET | Refills: 0 | Status: SHIPPED | OUTPATIENT
Start: 2019-10-21 | End: 2019-10-30

## 2019-10-21 RX ORDER — AMOXICILLIN AND CLAVULANATE POTASSIUM 500; 125 MG/1; MG/1
TABLET, FILM COATED ORAL
Qty: 20 TABLET | Refills: 0 | Status: SHIPPED | OUTPATIENT
Start: 2019-10-21 | End: 2019-10-31

## 2019-10-21 NOTE — PROGRESS NOTES
Necia Koyanagi is a 51year old female. CC:  Patient presents with:  Pneumonia      HPI:  The patient has primary complaint of productive cough for  4 days. Associated symptoms include sweats and wheezing. The patient has not taken temperatures.  There ARTHROSCOPY ROTATOR CUFF REPAIR Right 6/25/2014    Performed by Yuniel Alexander MD at [facility] MAIN OR      Family History   Problem Relation Age of Onset   • Hypertension Mother    • Diabetes Mother    • Cancer Mother         Lung   • Other (Other) Mother PLAN    1. Productive cough  Take prescribed medications as directed. Rest and push fluids    2. Wheezing  As above     3.  Abnormal lung sounds  As above     F/u in 5-7 days    Orders for this visit:    No orders of the defined types were placed in this

## 2019-10-21 NOTE — TELEPHONE ENCOUNTER
Pt called, thinks she has pneumonia and would like to be seen this morning.    Please call pt at [phone]

## 2019-10-28 ENCOUNTER — OFFICE VISIT (OUTPATIENT)
Dept: FAMILY MEDICINE CLINIC | Facility: CLINIC | Age: 49
End: 2019-10-28
Payer: COMMERCIAL

## 2019-10-28 VITALS
BODY MASS INDEX: 34 KG/M2 | SYSTOLIC BLOOD PRESSURE: 110 MMHG | DIASTOLIC BLOOD PRESSURE: 80 MMHG | HEART RATE: 64 BPM | TEMPERATURE: 97 F | RESPIRATION RATE: 12 BRPM | WEIGHT: 196.38 LBS

## 2019-10-28 DIAGNOSIS — R05.8 PRODUCTIVE COUGH: Primary | ICD-10-CM

## 2019-10-28 DIAGNOSIS — R06.2 WHEEZING: ICD-10-CM

## 2019-10-28 PROCEDURE — 99213 OFFICE O/P EST LOW 20 MIN: CPT | Performed by: FAMILY MEDICINE

## 2019-10-28 NOTE — PROGRESS NOTES
Lamar Smiley is a 51year old female. CC:  Patient presents with:  Pneumonia      HPI:  F/u Cough, wheeze. She is doing better, yet still coughing. Wheeze is better. No fevers. There is some diarrhea.   Allergies:    Darvocet [Propoxyph*    NAUSEA AN HISTORY  2013    left eye surgery (muscle repair)   • REMOVAL OF KIDNEY STONE     • SHOULDER ARTHROSCOPY ROTATOR CUFF REPAIR Right 6/25/2014    Performed by Lamar Coughlin MD at [facility] MAIN OR      Family History   Problem Relation Age of Onset   • Hyperten not examined  LYMPH: no supraclavicular nodes  MUSCULOSKELETAL: normal ambulation  NEURO: Awake and alert. Normal speech and articulation. No facial droop or asymmetry. Moving all extremities equally. ASSESSMENT AND PLAN    1.  Productive cough  Improvin

## 2019-12-26 ENCOUNTER — PATIENT MESSAGE (OUTPATIENT)
Dept: FAMILY MEDICINE CLINIC | Facility: CLINIC | Age: 49
End: 2019-12-26

## 2019-12-26 RX ORDER — AMOXICILLIN AND CLAVULANATE POTASSIUM 500; 125 MG/1; MG/1
TABLET, FILM COATED ORAL
Qty: 20 TABLET | Refills: 0 | Status: SHIPPED | OUTPATIENT
Start: 2019-12-26 | End: 2020-01-05

## 2019-12-28 RX ORDER — ALBUTEROL SULFATE 90 UG/1
2 AEROSOL, METERED RESPIRATORY (INHALATION) EVERY 4 HOURS PRN
Qty: 1 INHALER | Refills: 5 | Status: SHIPPED | OUTPATIENT
Start: 2019-12-28 | End: 2021-01-22

## 2019-12-28 NOTE — TELEPHONE ENCOUNTER
Last refilled on 6/21/18 for # 1 with 0 refills  Last OV 10/28/19  No future appointments. Thank you.

## 2020-02-11 ENCOUNTER — TELEPHONE (OUTPATIENT)
Dept: FAMILY MEDICINE CLINIC | Facility: CLINIC | Age: 50
End: 2020-02-11

## 2020-02-11 ENCOUNTER — PATIENT MESSAGE (OUTPATIENT)
Dept: FAMILY MEDICINE CLINIC | Facility: CLINIC | Age: 50
End: 2020-02-11

## 2020-02-11 DIAGNOSIS — Z12.31 BREAST CANCER SCREENING BY MAMMOGRAM: Primary | ICD-10-CM

## 2020-02-11 NOTE — TELEPHONE ENCOUNTER
Forward to Dr. Radha Sloan, can you place order for the following:    FAVIOLA Banegas Nurse             [facility] 1 year      Please send message to nurse pool to send pt a letter to schedule.

## 2020-02-12 NOTE — TELEPHONE ENCOUNTER
From: Kolby Mckeon  To: Terrell Robles MD  Sent: 2/11/2020 5:43 PM CST  Subject: Non-Urgent Medical Question    Hi doc! Can u please put an order in for my mammogram? It's time. Thank you!     Gabby

## 2020-03-03 ENCOUNTER — HOSPITAL ENCOUNTER (OUTPATIENT)
Dept: MAMMOGRAPHY | Age: 50
Discharge: HOME | End: 2020-03-03
Attending: FAMILY MEDICINE
Payer: COMMERCIAL

## 2020-03-03 DIAGNOSIS — Z12.31 BREAST CANCER SCREENING BY MAMMOGRAM: ICD-10-CM

## 2020-03-03 PROCEDURE — 77067 SCR MAMMO BI INCL CAD: CPT | Performed by: FAMILY MEDICINE

## 2020-03-06 ENCOUNTER — OFFICE VISIT (OUTPATIENT)
Dept: FAMILY MEDICINE CLINIC | Facility: CLINIC | Age: 50
End: 2020-03-06
Payer: COMMERCIAL

## 2020-03-06 VITALS
WEIGHT: 208 LBS | TEMPERATURE: 97 F | BODY MASS INDEX: 36 KG/M2 | DIASTOLIC BLOOD PRESSURE: 80 MMHG | SYSTOLIC BLOOD PRESSURE: 125 MMHG

## 2020-03-06 DIAGNOSIS — H65.02 NON-RECURRENT ACUTE SEROUS OTITIS MEDIA OF LEFT EAR: Primary | ICD-10-CM

## 2020-03-06 DIAGNOSIS — F41.9 ANXIETY: ICD-10-CM

## 2020-03-06 PROCEDURE — 99214 OFFICE O/P EST MOD 30 MIN: CPT | Performed by: FAMILY MEDICINE

## 2020-03-06 RX ORDER — ALPRAZOLAM 0.25 MG/1
TABLET ORAL
Qty: 30 TABLET | Refills: 0 | Status: SHIPPED | OUTPATIENT
Start: 2020-03-06 | End: 2021-01-22

## 2020-03-06 RX ORDER — AMOXICILLIN AND CLAVULANATE POTASSIUM 500; 125 MG/1; MG/1
TABLET, FILM COATED ORAL
Qty: 20 TABLET | Refills: 0 | Status: SHIPPED | OUTPATIENT
Start: 2020-03-06 | End: 2020-03-16

## 2020-03-06 NOTE — PROGRESS NOTES
Marisol Hinton is a 51year old female. CC:  L ear pain    HPI:  She comes to the office w/o appt. The patient has primary complaint of left ear pain for  1 day. Associated symptoms include nasal congestion x 1 weeks.  The patient has not had temperat COLONOSCOPY, POSSIBLE BIOPSY, POSSIBLE POLYPECTOMY 58714 N/A 3/17/2015    Performed by Inge Leslie MD at [address] [place] [address]   • ESOPHAGOGASTRODUODENOSCOPY, POSSIBLE BIOPSY, POSSIBLE POLYPECTOMY 02078 N/A 3/17/2015    Performed by Inge Leslie MD at [facility] A developed, well nourished, in no apparent distress  EYE: B conjunctiva and lids normal  HENT: L TM with serous effusion along with erythema and bulging drum at superior TM, R TM is normal. B nares boggy with clear d/c.  Post nasal drip and cobblestoning pre Clavulanate (AUGMENTIN) 500-125 MG Oral Tab 20 tablet 0     Si tab bid x 10 days with food   • ALPRAZolam (XANAX) 0.25 MG Oral Tab 30 tablet 0     Sig: Daily as needed         No follow-ups on file.       Authorized by Lev Fowler M.D.

## 2020-07-15 ENCOUNTER — OFFICE VISIT (OUTPATIENT)
Dept: FAMILY MEDICINE CLINIC | Facility: CLINIC | Age: 50
End: 2020-07-15
Payer: COMMERCIAL

## 2020-07-15 VITALS
RESPIRATION RATE: 18 BRPM | TEMPERATURE: 98 F | OXYGEN SATURATION: 96 % | DIASTOLIC BLOOD PRESSURE: 64 MMHG | SYSTOLIC BLOOD PRESSURE: 128 MMHG | HEART RATE: 80 BPM | BODY MASS INDEX: 37.82 KG/M2 | HEIGHT: 63.5 IN | WEIGHT: 216.13 LBS

## 2020-07-15 DIAGNOSIS — Z00.00 WELL ADULT EXAM: Primary | ICD-10-CM

## 2020-07-15 DIAGNOSIS — F41.9 ANXIETY: ICD-10-CM

## 2020-07-15 DIAGNOSIS — E03.8 OTHER SPECIFIED HYPOTHYROIDISM: ICD-10-CM

## 2020-07-15 DIAGNOSIS — E78.5 HYPERLIPIDEMIA, UNSPECIFIED HYPERLIPIDEMIA TYPE: ICD-10-CM

## 2020-07-15 PROCEDURE — 3074F SYST BP LT 130 MM HG: CPT | Performed by: FAMILY MEDICINE

## 2020-07-15 PROCEDURE — 99396 PREV VISIT EST AGE 40-64: CPT | Performed by: FAMILY MEDICINE

## 2020-07-15 PROCEDURE — 3008F BODY MASS INDEX DOCD: CPT | Performed by: FAMILY MEDICINE

## 2020-07-15 PROCEDURE — 3078F DIAST BP <80 MM HG: CPT | Performed by: FAMILY MEDICINE

## 2020-07-15 NOTE — PROGRESS NOTES
Michael Johnson is a 53year old female.     CC:  Patient presents with:  Physical      HPI:  Patient is here for yearly, wellness exam  Last Lipid:  Lab Results   Component Value Date    CHOLEST 235 (H) 06/19/2019    TRIG 150 (H) 06/19/2019    HDL 54 06/1 hours 4 tablet 2        History:  Past Medical History:   Diagnosis Date   • Chronic diarrhea     due to dumping syndrome   • History of kidney stones    • History of polycystic ovarian disease    • Hyperlipidemia    • Hypothyroid    • Irritable bowel    • comment: 2 packs a week     Alcohol use: No      Alcohol/week: 0.0 standard drinks    Drug use: No       ROS:  General: energy level stable  Cardiovascular/Pulses: Denies palpitations, tachycardia, irregular heart beat, chest pain, exertional chest pain or 6 months if still needing the Xanax    Orders for this visit:    Orders Placed This Encounter      CBC, Platelet, No Differential [E]          Standing Status: Future          Standing Expiration Date: 7/15/2021      Comp Metabolic Panel (14) [E]

## 2020-07-27 ENCOUNTER — TELEPHONE (OUTPATIENT)
Dept: FAMILY MEDICINE CLINIC | Facility: CLINIC | Age: 50
End: 2020-07-27

## 2020-07-27 ENCOUNTER — LAB ENCOUNTER (OUTPATIENT)
Dept: LAB | Facility: HOSPITAL | Age: 50
End: 2020-07-27
Attending: FAMILY MEDICINE
Payer: COMMERCIAL

## 2020-07-27 ENCOUNTER — APPOINTMENT (OUTPATIENT)
Dept: LAB | Age: 50
End: 2020-07-27
Attending: FAMILY MEDICINE
Payer: COMMERCIAL

## 2020-07-27 DIAGNOSIS — Z00.00 WELL ADULT EXAM: ICD-10-CM

## 2020-07-27 DIAGNOSIS — E78.5 HYPERLIPIDEMIA, UNSPECIFIED HYPERLIPIDEMIA TYPE: ICD-10-CM

## 2020-07-27 DIAGNOSIS — E03.8 OTHER SPECIFIED HYPOTHYROIDISM: ICD-10-CM

## 2020-07-27 DIAGNOSIS — Z20.822 CLOSE EXPOSURE TO COVID-19 VIRUS: ICD-10-CM

## 2020-07-27 DIAGNOSIS — Z20.822 CLOSE EXPOSURE TO COVID-19 VIRUS: Primary | ICD-10-CM

## 2020-07-27 LAB
ALBUMIN SERPL-MCNC: 3.7 G/DL (ref 3.4–5)
ALBUMIN/GLOB SERPL: 1.1 {RATIO} (ref 1–2)
ALP LIVER SERPL-CCNC: 74 U/L (ref 39–100)
ALT SERPL-CCNC: 24 U/L (ref 13–56)
ANION GAP SERPL CALC-SCNC: 1 MMOL/L (ref 0–18)
AST SERPL-CCNC: 17 U/L (ref 15–37)
BILIRUB SERPL-MCNC: 0.3 MG/DL (ref 0.1–2)
BUN BLD-MCNC: 12 MG/DL (ref 7–18)
BUN/CREAT SERPL: 14.1 (ref 10–20)
CALCIUM BLD-MCNC: 9.2 MG/DL (ref 8.5–10.1)
CHLORIDE SERPL-SCNC: 109 MMOL/L (ref 98–112)
CHOLEST SMN-MCNC: 243 MG/DL (ref ?–200)
CO2 SERPL-SCNC: 31 MMOL/L (ref 21–32)
CREAT BLD-MCNC: 0.85 MG/DL (ref 0.55–1.02)
DEPRECATED RDW RBC AUTO: 47.9 FL (ref 35.1–46.3)
ERYTHROCYTE [DISTWIDTH] IN BLOOD BY AUTOMATED COUNT: 14.2 % (ref 11–15)
GLOBULIN PLAS-MCNC: 3.4 G/DL (ref 2.8–4.4)
GLUCOSE BLD-MCNC: 93 MG/DL (ref 70–99)
HCT VFR BLD AUTO: 48.1 % (ref 35–48)
HDLC SERPL-MCNC: 46 MG/DL (ref 40–59)
HGB BLD-MCNC: 15.5 G/DL (ref 12–16)
LDLC SERPL CALC-MCNC: 133 MG/DL (ref ?–100)
M PROTEIN MFR SERPL ELPH: 7.1 G/DL (ref 6.4–8.2)
MCH RBC QN AUTO: 29.9 PG (ref 26–34)
MCHC RBC AUTO-ENTMCNC: 32.2 G/DL (ref 31–37)
MCV RBC AUTO: 92.9 FL (ref 80–100)
NONHDLC SERPL-MCNC: 197 MG/DL (ref ?–130)
OSMOLALITY SERPL CALC.SUM OF ELEC: 291 MOSM/KG (ref 275–295)
PATIENT FASTING Y/N/NP: YES
PATIENT FASTING Y/N/NP: YES
PLATELET # BLD AUTO: 242 10(3)UL (ref 150–450)
POTASSIUM SERPL-SCNC: 4.3 MMOL/L (ref 3.5–5.1)
RBC # BLD AUTO: 5.18 X10(6)UL (ref 3.8–5.3)
SODIUM SERPL-SCNC: 141 MMOL/L (ref 136–145)
T3FREE SERPL-MCNC: 2.27 PG/ML (ref 2.4–4.2)
T4 FREE SERPL-MCNC: 1.2 NG/DL (ref 0.8–1.7)
TRIGL SERPL-MCNC: 320 MG/DL (ref 30–149)
TSI SER-ACNC: 5.52 MIU/ML (ref 0.36–3.74)
VLDLC SERPL CALC-MCNC: 64 MG/DL (ref 0–30)
WBC # BLD AUTO: 10.1 X10(3) UL (ref 4–11)

## 2020-07-27 PROCEDURE — 80050 GENERAL HEALTH PANEL: CPT | Performed by: FAMILY MEDICINE

## 2020-07-27 PROCEDURE — 80061 LIPID PANEL: CPT | Performed by: FAMILY MEDICINE

## 2020-07-27 PROCEDURE — 84481 FREE ASSAY (FT-3): CPT | Performed by: FAMILY MEDICINE

## 2020-07-27 PROCEDURE — 36415 COLL VENOUS BLD VENIPUNCTURE: CPT | Performed by: FAMILY MEDICINE

## 2020-07-27 PROCEDURE — 84439 ASSAY OF FREE THYROXINE: CPT | Performed by: FAMILY MEDICINE

## 2020-07-27 NOTE — TELEPHONE ENCOUNTER
Pt was advised of provider recommendation below- verbalized understanding    Was advised that she will get a call to set up testing.

## 2020-07-27 NOTE — TELEPHONE ENCOUNTER
Daughter got positive COVID test results just this morning    Pt states she was in office this morning and got labs done. Pt is currently asymptomatic daughter is asymptomatic as well. Mom is wondering if she should be tested?   And if the whole famil

## 2020-07-27 NOTE — TELEPHONE ENCOUNTER
My feeling is that everyone should be tested who was exposed. Until test results are back then quarantine is in order. I have placed order for COVID testing for Gabby, her  Mae Clark and there son Karen Willingham. Thanks so much.

## 2020-07-28 LAB — SARS-COV-2 RNA RESP QL NAA+PROBE: NOT DETECTED

## 2020-07-28 RX ORDER — LIOTHYRONINE SODIUM 5 UG/1
5 TABLET ORAL DAILY
Qty: 90 TABLET | Refills: 0 | Status: SHIPPED | OUTPATIENT
Start: 2020-07-28 | End: 2020-09-10

## 2020-09-15 ENCOUNTER — PATIENT MESSAGE (OUTPATIENT)
Dept: FAMILY MEDICINE CLINIC | Facility: CLINIC | Age: 50
End: 2020-09-15

## 2020-09-15 RX ORDER — SIMVASTATIN 20 MG
20 TABLET ORAL NIGHTLY
Qty: 90 TABLET | Refills: 1 | Status: SHIPPED | OUTPATIENT
Start: 2020-09-15 | End: 2021-07-21

## 2020-09-15 NOTE — TELEPHONE ENCOUNTER
Medication pended with correct pharm.    Last refilled 7/3/19 #90 with 3 RF  LOV with TJ 7/15/20  No future appt made

## 2020-09-15 NOTE — TELEPHONE ENCOUNTER
From: German Dick  To: Kendell Amor MD  Sent: 9/15/2020 7:58 AM CDT  Subject: Prescription Question    Just [facility] to [facility] and it said my prescription is . Can you please send in a refill to them?  Also, when d

## 2020-11-25 ENCOUNTER — TELEMEDICINE (OUTPATIENT)
Dept: FAMILY MEDICINE CLINIC | Facility: CLINIC | Age: 50
End: 2020-11-25
Payer: COMMERCIAL

## 2020-11-25 DIAGNOSIS — J01.90 ACUTE NON-RECURRENT SINUSITIS, UNSPECIFIED LOCATION: Primary | ICD-10-CM

## 2020-11-25 PROCEDURE — 99214 OFFICE O/P EST MOD 30 MIN: CPT | Performed by: FAMILY MEDICINE

## 2020-11-25 RX ORDER — AMOXICILLIN AND CLAVULANATE POTASSIUM 500; 125 MG/1; MG/1
TABLET, FILM COATED ORAL
Qty: 20 TABLET | Refills: 0 | Status: SHIPPED | OUTPATIENT
Start: 2020-11-25 | End: 2021-01-05

## 2020-11-25 NOTE — PROGRESS NOTES
My Chart/ Video/Telephone Visit Check-In Due to [address] verbally consents a video Check-In service on 11/25/20.   Patient understands and accepts financial responsibility for any deductible, co-insurance and/or co-pays associated bowel    • Lumbar spondylosis    • GALINA on CPAP       Past Surgical History:   Procedure Laterality Date   • CHOLECYSTECTOMY     • COLONOSCOPY, POSSIBLE BIOPSY, POSSIBLE POLYPECTOMY 27825 N/A 3/17/2015    Performed by Elena Kidd MD at [address] [place] [address] dyspnea, dyspnea on exertion  GI: Denies abdominal pain, diarrhea     Physical Exam:  General: No apparent distress when conversing with me  Psych: Alert and oriented x 3, speech was not pressured  Resp: No respiratory distress noted when conversing with m

## 2020-12-16 ENCOUNTER — PATIENT MESSAGE (OUTPATIENT)
Dept: FAMILY MEDICINE CLINIC | Facility: CLINIC | Age: 50
End: 2020-12-16

## 2020-12-16 NOTE — TELEPHONE ENCOUNTER
From: Antoine Cruz  To: Carmen Pantoja MD  Sent: 12/16/2020 9:10 AM CST  Subject: Non-Urgent Medical Question    Hi doc. Ok, I know you tell me to stop googling my symptoms. ..but I googled my symptoms with my stomach and what I thought was my liver flipp DISPLAY PLAN FREE TEXT

## 2021-01-05 ENCOUNTER — OFFICE VISIT (OUTPATIENT)
Dept: SURGERY | Facility: CLINIC | Age: 51
End: 2021-01-05
Payer: COMMERCIAL

## 2021-01-05 VITALS — HEIGHT: 63.5 IN | HEART RATE: 84 BPM | WEIGHT: 224 LBS | BODY MASS INDEX: 39.2 KG/M2

## 2021-01-05 DIAGNOSIS — K43.9 HERNIA OF ANTERIOR ABDOMINAL WALL: Primary | ICD-10-CM

## 2021-01-05 PROCEDURE — 3008F BODY MASS INDEX DOCD: CPT | Performed by: SURGERY

## 2021-01-05 PROCEDURE — 99244 OFF/OP CNSLTJ NEW/EST MOD 40: CPT | Performed by: SURGERY

## 2021-01-05 NOTE — H&P
Micha Barrett is a 53year old female  Patient presents with:  Hernia: New patient referred by Dr. Gavi Jones for possible abdominal hernia. c/o right sided lump with pain. Denies any nasuea or vomiting.        REFERRED BY    Patient presents with complaint o Tab, Take 1 tablet (5 mcg total) by mouth daily. , Disp: 90 tablet, Rfl: 0    •  ALPRAZolam (XANAX) 0.25 MG Oral Tab, Daily as needed, Disp: 30 tablet, Rfl: 0    •  Albuterol Sulfate HFA (PROAIR HFA) 108 (90 Base) MCG/ACT Inhalation Aero Soln, Inhale 2 puff urine  MUSCULOSKELETAL: no joint complaints upper or lower extremities  HEMATOLOGY: denies hx anemia; denies bruising or excessive bleeding  Endocrine: no weight gain or loss no hot or cold intolerance    EXAM     Pulse 84, height 63.5\", weight 224 lb (10 being used under the Food and Drug Administration's Emergency Use Authorization. The authorized Fact Sheet for Healthcare Providers for this assay is available upon request from the laboratory.        ASSESSMENT   Imp: Suspect trocar site hernia with in

## 2021-01-12 ENCOUNTER — TELEPHONE (OUTPATIENT)
Dept: SURGERY | Facility: CLINIC | Age: 51
End: 2021-01-12

## 2021-01-12 NOTE — TELEPHONE ENCOUNTER
Prior auth for CT abdomen authorized. Order ID: [id number]  Valid Dates: 01/12/2021 - 02/10/2021    GetThis message sent to pt.

## 2021-01-26 ENCOUNTER — TELEPHONE (OUTPATIENT)
Dept: FAMILY MEDICINE CLINIC | Facility: CLINIC | Age: 51
End: 2021-01-26

## 2021-01-26 NOTE — TELEPHONE ENCOUNTER
Patient advised the [facility] [employer] form has been completed by Dr. Nataliya Ceja and is ready for . Copy left at . Copy sent to scanning.

## 2021-02-05 ENCOUNTER — PATIENT MESSAGE (OUTPATIENT)
Dept: FAMILY MEDICINE CLINIC | Facility: CLINIC | Age: 51
End: 2021-02-05

## 2021-02-05 DIAGNOSIS — Z12.31 BREAST CANCER SCREENING BY MAMMOGRAM: Primary | ICD-10-CM

## 2021-02-05 NOTE — TELEPHONE ENCOUNTER
From: Sharonda Chavira  To: Ritu Christina MD  Sent: 2/5/2021 11:56 AM CST  Subject: Non-Urgent Medical Question    Hi doc! I just got a reminder letter that I am do for my mammogram next month. Can you put the order in for me and then I will schedule it?  Floreen Babinski

## 2021-03-15 ENCOUNTER — PATIENT MESSAGE (OUTPATIENT)
Dept: FAMILY MEDICINE CLINIC | Facility: CLINIC | Age: 51
End: 2021-03-15

## 2021-03-15 NOTE — TELEPHONE ENCOUNTER
From: Lola Garland  To:  Keke Sena MD  Sent: 3/15/2021 5:11 AM CDT  Subject: Other    Couldn't get through to anyone on the phone and mychart wouldn't let me cancel my mammogram. I have a sick little one here and won't be able to make my appointment

## 2021-03-22 ENCOUNTER — PATIENT MESSAGE (OUTPATIENT)
Dept: FAMILY MEDICINE CLINIC | Facility: CLINIC | Age: 51
End: 2021-03-22

## 2021-03-22 RX ORDER — LIOTHYRONINE SODIUM 5 UG/1
5 TABLET ORAL DAILY
Qty: 90 TABLET | Refills: 0 | Status: SHIPPED | OUTPATIENT
Start: 2021-03-22 | End: 2021-09-15

## 2021-03-22 NOTE — TELEPHONE ENCOUNTER
LOV: 7/15/20   Last Refill: 9/10/20 #90 0 RF    Lab Results   Component Value Date    T4F 1.2 07/27/2020    TSH 5.520 (H) 07/27/2020

## 2021-03-22 NOTE — TELEPHONE ENCOUNTER
From: Alejandra Sepulveda  To: Adry Betancourt MD  Sent: 3/22/2021 12:07 PM CDT  Subject: Prescription Question    Hello! [facility] you [facility]?  I am out of refills for Liothyronine Sod tabs 5 Mcg.   I just ordered my last refill also

## 2021-03-29 ENCOUNTER — HOSPITAL ENCOUNTER (OUTPATIENT)
Dept: MAMMOGRAPHY | Age: 51
Discharge: HOME | End: 2021-03-29
Attending: FAMILY MEDICINE
Payer: COMMERCIAL

## 2021-03-29 DIAGNOSIS — Z12.31 BREAST CANCER SCREENING BY MAMMOGRAM: ICD-10-CM

## 2021-03-29 PROCEDURE — 77067 SCR MAMMO BI INCL CAD: CPT | Performed by: FAMILY MEDICINE

## 2021-07-20 ENCOUNTER — OFFICE VISIT (OUTPATIENT)
Dept: FAMILY MEDICINE CLINIC | Facility: CLINIC | Age: 51
End: 2021-07-20
Payer: COMMERCIAL

## 2021-07-20 VITALS
WEIGHT: 240 LBS | DIASTOLIC BLOOD PRESSURE: 86 MMHG | HEART RATE: 76 BPM | BODY MASS INDEX: 40.97 KG/M2 | OXYGEN SATURATION: 97 % | HEIGHT: 64 IN | SYSTOLIC BLOOD PRESSURE: 126 MMHG | TEMPERATURE: 98 F | RESPIRATION RATE: 18 BRPM

## 2021-07-20 DIAGNOSIS — E78.5 HYPERLIPIDEMIA, UNSPECIFIED HYPERLIPIDEMIA TYPE: ICD-10-CM

## 2021-07-20 DIAGNOSIS — F41.9 ANXIETY: ICD-10-CM

## 2021-07-20 DIAGNOSIS — Z00.00 WELL ADULT EXAM: Primary | ICD-10-CM

## 2021-07-20 DIAGNOSIS — F43.21 GRIEF REACTION: ICD-10-CM

## 2021-07-20 DIAGNOSIS — E03.8 OTHER SPECIFIED HYPOTHYROIDISM: ICD-10-CM

## 2021-07-20 LAB
ALT SERPL-CCNC: 42 U/L
ANION GAP SERPL CALC-SCNC: 6 MMOL/L (ref 0–18)
AST SERPL-CCNC: 29 U/L (ref 15–37)
BUN BLD-MCNC: 9 MG/DL (ref 7–18)
BUN/CREAT SERPL: 11.8 (ref 10–20)
CALCIUM BLD-MCNC: 9 MG/DL (ref 8.5–10.1)
CHLORIDE SERPL-SCNC: 102 MMOL/L (ref 98–112)
CHOLEST SMN-MCNC: 242 MG/DL (ref ?–200)
CO2 SERPL-SCNC: 29 MMOL/L (ref 21–32)
CREAT BLD-MCNC: 0.76 MG/DL
DEPRECATED RDW RBC AUTO: 48.1 FL (ref 35.1–46.3)
ERYTHROCYTE [DISTWIDTH] IN BLOOD BY AUTOMATED COUNT: 14 % (ref 11–15)
GLUCOSE BLD-MCNC: 96 MG/DL (ref 70–99)
HCT VFR BLD AUTO: 44.7 %
HDLC SERPL-MCNC: 58 MG/DL (ref 40–59)
HGB BLD-MCNC: 14.3 G/DL
LDLC SERPL CALC-MCNC: 138 MG/DL (ref ?–100)
MCH RBC QN AUTO: 29.7 PG (ref 26–34)
MCHC RBC AUTO-ENTMCNC: 32 G/DL (ref 31–37)
MCV RBC AUTO: 92.9 FL
NONHDLC SERPL-MCNC: 184 MG/DL (ref ?–130)
OSMOLALITY SERPL CALC.SUM OF ELEC: 283 MOSM/KG (ref 275–295)
PATIENT FASTING Y/N/NP: YES
PATIENT FASTING Y/N/NP: YES
PLATELET # BLD AUTO: 228 10(3)UL (ref 150–450)
POTASSIUM SERPL-SCNC: 4 MMOL/L (ref 3.5–5.1)
RBC # BLD AUTO: 4.81 X10(6)UL
SODIUM SERPL-SCNC: 137 MMOL/L (ref 136–145)
T3FREE SERPL-MCNC: 2.48 PG/ML (ref 2.4–4.2)
T4 FREE SERPL-MCNC: 0.9 NG/DL (ref 0.8–1.7)
TRIGL SERPL-MCNC: 256 MG/DL (ref 30–149)
TSI SER-ACNC: 14 MIU/ML (ref 0.36–3.74)
VLDLC SERPL CALC-MCNC: 48 MG/DL (ref 0–30)
WBC # BLD AUTO: 9.4 X10(3) UL (ref 4–11)

## 2021-07-20 PROCEDURE — 84439 ASSAY OF FREE THYROXINE: CPT | Performed by: FAMILY MEDICINE

## 2021-07-20 PROCEDURE — 99396 PREV VISIT EST AGE 40-64: CPT | Performed by: FAMILY MEDICINE

## 2021-07-20 PROCEDURE — 84460 ALANINE AMINO (ALT) (SGPT): CPT | Performed by: FAMILY MEDICINE

## 2021-07-20 PROCEDURE — 80048 BASIC METABOLIC PNL TOTAL CA: CPT | Performed by: FAMILY MEDICINE

## 2021-07-20 PROCEDURE — 84450 TRANSFERASE (AST) (SGOT): CPT | Performed by: FAMILY MEDICINE

## 2021-07-20 PROCEDURE — 84481 FREE ASSAY (FT-3): CPT | Performed by: FAMILY MEDICINE

## 2021-07-20 PROCEDURE — 84443 ASSAY THYROID STIM HORMONE: CPT | Performed by: FAMILY MEDICINE

## 2021-07-20 PROCEDURE — 80061 LIPID PANEL: CPT | Performed by: FAMILY MEDICINE

## 2021-07-20 PROCEDURE — 3074F SYST BP LT 130 MM HG: CPT | Performed by: FAMILY MEDICINE

## 2021-07-20 PROCEDURE — 3008F BODY MASS INDEX DOCD: CPT | Performed by: FAMILY MEDICINE

## 2021-07-20 PROCEDURE — 3079F DIAST BP 80-89 MM HG: CPT | Performed by: FAMILY MEDICINE

## 2021-07-20 PROCEDURE — 85027 COMPLETE CBC AUTOMATED: CPT | Performed by: FAMILY MEDICINE

## 2021-07-20 RX ORDER — ALPRAZOLAM 0.25 MG/1
0.5 TABLET ORAL
COMMUNITY
Start: 2021-07-20

## 2021-07-20 NOTE — PROGRESS NOTES
Nohemi Owens is a 49year old female.     CC:  Patient presents with:  Physical: per pt      HPI:  Patient is here for yearly, wellness exam  Last Lipid:  Lab Results   Component Value Date    CHOLEST 243 (H) 07/27/2020    TRIG 320 (H) 07/27/2020    HDL 3/17/2015    Procedure: COLONOSCOPY, POSSIBLE BIOPSY, POSSIBLE POLYPECTOMY 30525;  Surgeon: Kavin Polanco MD;  Location: [facility]   • HYSTERECTOMY      MACY-BSO '02--benign   • LAP SLEEVE GASTRECTOMY     • OTHER SURGICAL HISTORY      bladder suspension, M.D.    Physical Exam:  GEN: well developed, well nourished, in no apparent distress  EYE: B conjunctiva and lids normal  HENT: B pinnas, external auditory canals and tympanic membranes are normal.   NECK: No lymphadenopathy, thyromegaly or masses  CAR: S1 Basic Metabolic Panel (8)          Standing Status: Future          Number of Occurrences: 1          Standing Expiration Date: 7/20/2022      CBC, Platelet;  No Differential          Standing Status: Future          Number of Occurrences: 1          Standi

## 2021-09-15 RX ORDER — LIOTHYRONINE SODIUM 5 UG/1
TABLET ORAL
Qty: 90 TABLET | Refills: 1 | Status: SHIPPED | OUTPATIENT
Start: 2021-09-15

## 2021-09-15 NOTE — TELEPHONE ENCOUNTER
Thyroid Supplements Protocol Failed 09/15/2021 01:32 PM   Protocol Details  TSH value between 0.350 and 5.500 IU/ml    TSH test in past 12 months    Appointment in past 12 or next 3 months     Last refilled on 03/22/2021 for # 90 with 0 rf.    Last labs 07/

## 2021-09-16 RX ORDER — LEVOTHYROXINE SODIUM 0.03 MG/1
25 TABLET ORAL
Qty: 90 TABLET | Refills: 2 | Status: SHIPPED | OUTPATIENT
Start: 2021-09-16

## 2021-09-16 RX ORDER — ROSUVASTATIN CALCIUM 10 MG/1
10 TABLET, COATED ORAL NIGHTLY
Qty: 90 TABLET | Refills: 2 | Status: SHIPPED | OUTPATIENT
Start: 2021-09-16

## 2021-11-01 ENCOUNTER — TELEMEDICINE (OUTPATIENT)
Dept: FAMILY MEDICINE CLINIC | Facility: CLINIC | Age: 51
End: 2021-11-01
Payer: COMMERCIAL

## 2021-11-01 DIAGNOSIS — M25.551 RIGHT HIP PAIN: Primary | ICD-10-CM

## 2021-11-01 PROCEDURE — 99213 OFFICE O/P EST LOW 20 MIN: CPT | Performed by: FAMILY MEDICINE

## 2021-11-01 RX ORDER — MELOXICAM 15 MG/1
15 TABLET ORAL DAILY
Qty: 30 TABLET | Refills: 0 | Status: SHIPPED | OUTPATIENT
Start: 2021-11-01 | End: 2021-12-01

## 2021-11-01 NOTE — PROGRESS NOTES
My Chart/ Video/Telephone Visit Check-In Due to [address] verbally consents a video Check-In service on 11/01/21.   Patient understands and accepts financial responsibility for any deductible, co-insurance and/or co-pays associated COLONOSCOPY,BIOPSY N/A 3/17/2015    Procedure: COLONOSCOPY, POSSIBLE BIOPSY, POSSIBLE POLYPECTOMY 87142;  Surgeon: Jihan Allan MD;  Location: [facility]   • HYSTERECTOMY      MACY-BSO '02--benign   • LAP SLEEVE GASTRECTOMY     • OTHER SURGICAL HISTORY ASSESSMENT AND PLAN    1. Right hip pain  Possible IT band issue. She defers PT for now due to time constraints. Trial of Mobic and home IT band stretches. She is to see me in 2-3 weeks if not doing better.       Orders for this visit:    No orders o

## 2022-01-04 ENCOUNTER — PATIENT MESSAGE (OUTPATIENT)
Dept: FAMILY MEDICINE CLINIC | Facility: CLINIC | Age: 52
End: 2022-01-04

## 2022-01-04 NOTE — TELEPHONE ENCOUNTER
From: Elías Paul  To: Nicole Staley MD  Sent: 1/4/2022 3:49 PM CST  Subject: Vaccine    Hi doc. Talked to sosa earlier. Grandson tested positive and son is showing symptoms. Have my mother in law quarantined in her bedroom/den.  Guessing we should get

## 2022-01-18 ENCOUNTER — TELEPHONE (OUTPATIENT)
Dept: FAMILY MEDICINE CLINIC | Facility: CLINIC | Age: 52
End: 2022-01-18

## 2022-01-18 NOTE — TELEPHONE ENCOUNTER
Spoke with the pt and advised of the notes from Dr. Sahara Sanchez and she states that she can do a VV with mom tomorrow if Dr. Sahara Sanchez is agreeable- pt states that she needs the letter by Thursday at [address] [place] [address] to set up establish care VV for tomorrow?

## 2022-01-18 NOTE — TELEPHONE ENCOUNTER
Here is the issue. Her mother in law has not officially my patient as she has not been seen by me.  Mother in law needs to see me at some point in time and then I can make a note for Gabby.  Thanks

## 2022-01-18 NOTE — TELEPHONE ENCOUNTER
Pt had additional questions. She was disconnected before I could get her questions for [facility].

## 2022-01-18 NOTE — TELEPHONE ENCOUNTER
Pt states she was working remotely for her work and since she is taking care of her mother in law, Radha Louie, work forced her to quit.  Pt is trying to get unemployment and needs a note from [facility] primary stating she should not be left alone due

## 2022-02-08 ENCOUNTER — PATIENT MESSAGE (OUTPATIENT)
Dept: FAMILY MEDICINE CLINIC | Facility: CLINIC | Age: 52
End: 2022-02-08

## 2022-02-08 NOTE — TELEPHONE ENCOUNTER
From: Oni Fisher  To: Toney Purcell MD  Sent: 2/8/2022 11:59 AM CST  Subject: Appointment    Hi doc. I think its time for you and I to have a discussion about depression/anxiety. Do you want to see me in person? I am no longer working so I can pretty much come whenever. Let me know if you want me to make an appointment. I'm sure I am probably due for blood work too.     Gabby

## 2022-03-16 ENCOUNTER — OFFICE VISIT (OUTPATIENT)
Dept: FAMILY MEDICINE CLINIC | Facility: CLINIC | Age: 52
End: 2022-03-16
Payer: COMMERCIAL

## 2022-03-16 VITALS
DIASTOLIC BLOOD PRESSURE: 90 MMHG | HEIGHT: 64 IN | HEART RATE: 74 BPM | SYSTOLIC BLOOD PRESSURE: 120 MMHG | BODY MASS INDEX: 39.95 KG/M2 | TEMPERATURE: 97 F | OXYGEN SATURATION: 97 % | WEIGHT: 234 LBS

## 2022-03-16 DIAGNOSIS — Z12.31 ENCOUNTER FOR SCREENING MAMMOGRAM FOR MALIGNANT NEOPLASM OF BREAST: Primary | ICD-10-CM

## 2022-03-16 DIAGNOSIS — F41.8 DEPRESSION WITH ANXIETY: ICD-10-CM

## 2022-03-16 DIAGNOSIS — M26.621 TMJ TENDERNESS, RIGHT: ICD-10-CM

## 2022-03-16 PROCEDURE — 3080F DIAST BP >= 90 MM HG: CPT | Performed by: FAMILY MEDICINE

## 2022-03-16 PROCEDURE — 3074F SYST BP LT 130 MM HG: CPT | Performed by: FAMILY MEDICINE

## 2022-03-16 PROCEDURE — 99214 OFFICE O/P EST MOD 30 MIN: CPT | Performed by: FAMILY MEDICINE

## 2022-03-16 PROCEDURE — 3008F BODY MASS INDEX DOCD: CPT | Performed by: FAMILY MEDICINE

## 2022-03-16 RX ORDER — FLUOXETINE HYDROCHLORIDE 40 MG/1
40 CAPSULE ORAL DAILY
Qty: 30 CAPSULE | Refills: 0 | Status: SHIPPED | OUTPATIENT
Start: 2022-03-16 | End: 2022-03-30

## 2022-04-01 ENCOUNTER — HOSPITAL ENCOUNTER (OUTPATIENT)
Dept: MAMMOGRAPHY | Age: 52
Discharge: HOME | End: 2022-04-01
Attending: FAMILY MEDICINE
Payer: COMMERCIAL

## 2022-04-01 DIAGNOSIS — Z12.31 ENCOUNTER FOR SCREENING MAMMOGRAM FOR MALIGNANT NEOPLASM OF BREAST: ICD-10-CM

## 2022-04-01 PROCEDURE — 77067 SCR MAMMO BI INCL CAD: CPT | Performed by: FAMILY MEDICINE

## 2022-04-06 RX ORDER — LEVOTHYROXINE SODIUM 0.2 MG/1
TABLET ORAL
Qty: 90 TABLET | Refills: 1 | Status: SHIPPED | OUTPATIENT
Start: 2022-04-06

## 2022-04-06 RX ORDER — ALPRAZOLAM 0.5 MG/1
0.5 TABLET ORAL
Qty: 90 TABLET | Refills: 0 | Status: SHIPPED | OUTPATIENT
Start: 2022-04-06

## 2022-04-20 ENCOUNTER — MED REC SCAN ONLY (OUTPATIENT)
Dept: FAMILY MEDICINE CLINIC | Facility: CLINIC | Age: 52
End: 2022-04-20

## 2022-05-04 PROCEDURE — 84439 ASSAY OF FREE THYROXINE: CPT | Performed by: FAMILY MEDICINE

## 2022-05-04 PROCEDURE — 84481 FREE ASSAY (FT-3): CPT | Performed by: FAMILY MEDICINE

## 2022-05-04 PROCEDURE — 84443 ASSAY THYROID STIM HORMONE: CPT | Performed by: FAMILY MEDICINE

## 2022-05-20 ENCOUNTER — PATIENT MESSAGE (OUTPATIENT)
Dept: FAMILY MEDICINE CLINIC | Facility: CLINIC | Age: 52
End: 2022-05-20

## 2022-05-20 NOTE — TELEPHONE ENCOUNTER
From: Calvin Bruce  To: Vitor Malin MD  Sent: 5/20/2022 10:26 AM CDT  Subject: Depression medication    Hi doc. This new medicine seems to be working. I feel like it could still be a little better, but definitely feeling better. Should we stay at same dose? If yes, can you please send a 3 month script to [facility]? Thanks!   Gabby

## 2022-05-23 RX ORDER — DULOXETIN HYDROCHLORIDE 60 MG/1
60 CAPSULE, DELAYED RELEASE ORAL DAILY
Qty: 30 CAPSULE | Refills: 0 | Status: SHIPPED | OUTPATIENT
Start: 2022-05-23

## 2022-05-27 ENCOUNTER — LAB ENCOUNTER (OUTPATIENT)
Dept: LAB | Age: 52
End: 2022-05-27
Attending: STUDENT IN AN ORGANIZED HEALTH CARE EDUCATION/TRAINING PROGRAM
Payer: COMMERCIAL

## 2022-05-27 ENCOUNTER — OFFICE VISIT (OUTPATIENT)
Dept: ENDOCRINOLOGY CLINIC | Facility: CLINIC | Age: 52
End: 2022-05-27
Payer: COMMERCIAL

## 2022-05-27 VITALS
WEIGHT: 229 LBS | SYSTOLIC BLOOD PRESSURE: 148 MMHG | HEART RATE: 79 BPM | BODY MASS INDEX: 39 KG/M2 | DIASTOLIC BLOOD PRESSURE: 90 MMHG

## 2022-05-27 DIAGNOSIS — E03.9 HYPOTHYROIDISM, UNSPECIFIED TYPE: ICD-10-CM

## 2022-05-27 DIAGNOSIS — L68.0 HIRSUTISM: Primary | ICD-10-CM

## 2022-05-27 DIAGNOSIS — L68.0 HIRSUTISM: ICD-10-CM

## 2022-05-27 LAB
DHEA-S SERPL-MCNC: 88.1 UG/DL
ESTRADIOL SERPL-MCNC: 20.8 PG/ML
FSH SERPL-ACNC: 49.1 MIU/ML
LH SERPL-ACNC: 18.1 MIU/ML
PROLACTIN SERPL-MCNC: 3.8 NG/ML
TESTOST SERPL-MCNC: 18.74 NG/DL

## 2022-05-27 PROCEDURE — 84146 ASSAY OF PROLACTIN: CPT

## 2022-05-27 PROCEDURE — 99205 OFFICE O/P NEW HI 60 MIN: CPT | Performed by: STUDENT IN AN ORGANIZED HEALTH CARE EDUCATION/TRAINING PROGRAM

## 2022-05-27 PROCEDURE — 82627 DEHYDROEPIANDROSTERONE: CPT

## 2022-05-27 PROCEDURE — 3080F DIAST BP >= 90 MM HG: CPT | Performed by: STUDENT IN AN ORGANIZED HEALTH CARE EDUCATION/TRAINING PROGRAM

## 2022-05-27 PROCEDURE — 82670 ASSAY OF TOTAL ESTRADIOL: CPT

## 2022-05-27 PROCEDURE — 83002 ASSAY OF GONADOTROPIN (LH): CPT

## 2022-05-27 PROCEDURE — 83001 ASSAY OF GONADOTROPIN (FSH): CPT

## 2022-05-27 PROCEDURE — 3077F SYST BP >= 140 MM HG: CPT | Performed by: STUDENT IN AN ORGANIZED HEALTH CARE EDUCATION/TRAINING PROGRAM

## 2022-05-27 PROCEDURE — 36415 COLL VENOUS BLD VENIPUNCTURE: CPT

## 2022-05-27 PROCEDURE — 84403 ASSAY OF TOTAL TESTOSTERONE: CPT

## 2022-06-20 ENCOUNTER — PATIENT MESSAGE (OUTPATIENT)
Dept: FAMILY MEDICINE CLINIC | Facility: CLINIC | Age: 52
End: 2022-06-20

## 2022-06-21 RX ORDER — DULOXETIN HYDROCHLORIDE 60 MG/1
60 CAPSULE, DELAYED RELEASE ORAL DAILY
Qty: 90 CAPSULE | Refills: 1 | Status: SHIPPED | OUTPATIENT
Start: 2022-06-21

## 2022-06-21 NOTE — TELEPHONE ENCOUNTER
From: Cheryl Loya  To: Donell Hayden MD  Sent: 5/20/2022 10:26 AM CDT  Subject: Depression medication    Hi doc. This new medicine seems to be working. I feel like it could still be a little better, but definitely feeling better. Should we stay at same dose? If yes, can you please send a 3 month script [facility]? Thanks!   Gabby

## 2022-07-13 RX ORDER — ALPRAZOLAM 0.5 MG/1
0.5 TABLET ORAL
Qty: 90 TABLET | Refills: 0 | Status: SHIPPED | OUTPATIENT
Start: 2022-07-13

## 2022-07-21 ENCOUNTER — OFFICE VISIT (OUTPATIENT)
Dept: FAMILY MEDICINE CLINIC | Facility: CLINIC | Age: 52
End: 2022-07-21
Payer: COMMERCIAL

## 2022-07-21 VITALS
BODY MASS INDEX: 38.76 KG/M2 | SYSTOLIC BLOOD PRESSURE: 126 MMHG | OXYGEN SATURATION: 96 % | DIASTOLIC BLOOD PRESSURE: 86 MMHG | HEART RATE: 81 BPM | TEMPERATURE: 98 F | WEIGHT: 227 LBS | HEIGHT: 64 IN

## 2022-07-21 DIAGNOSIS — K21.9 GASTROESOPHAGEAL REFLUX DISEASE, UNSPECIFIED WHETHER ESOPHAGITIS PRESENT: ICD-10-CM

## 2022-07-21 DIAGNOSIS — E03.9 HYPOTHYROIDISM, UNSPECIFIED TYPE: ICD-10-CM

## 2022-07-21 DIAGNOSIS — R73.9 HYPERGLYCEMIA: ICD-10-CM

## 2022-07-21 DIAGNOSIS — F32.89 OTHER DEPRESSION: ICD-10-CM

## 2022-07-21 DIAGNOSIS — E78.5 HYPERLIPIDEMIA, UNSPECIFIED HYPERLIPIDEMIA TYPE: ICD-10-CM

## 2022-07-21 DIAGNOSIS — Z00.00 WELL ADULT EXAM: Primary | ICD-10-CM

## 2022-07-21 LAB
ALT SERPL-CCNC: 44 U/L
ANION GAP SERPL CALC-SCNC: 6 MMOL/L (ref 0–18)
AST SERPL-CCNC: 31 U/L (ref 15–37)
BUN BLD-MCNC: 8 MG/DL (ref 7–18)
CALCIUM BLD-MCNC: 9.2 MG/DL (ref 8.5–10.1)
CHLORIDE SERPL-SCNC: 107 MMOL/L (ref 98–112)
CHOLEST SERPL-MCNC: 148 MG/DL (ref ?–200)
CO2 SERPL-SCNC: 29 MMOL/L (ref 21–32)
CREAT BLD-MCNC: 0.96 MG/DL
ERYTHROCYTE [DISTWIDTH] IN BLOOD BY AUTOMATED COUNT: 13.7 %
FASTING PATIENT LIPID ANSWER: YES
FASTING STATUS PATIENT QL REPORTED: YES
GLUCOSE BLD-MCNC: 192 MG/DL (ref 70–99)
HCT VFR BLD AUTO: 45.4 %
HDLC SERPL-MCNC: 54 MG/DL (ref 40–59)
HGB BLD-MCNC: 15.1 G/DL
LDLC SERPL CALC-MCNC: 65 MG/DL (ref ?–100)
MCH RBC QN AUTO: 29.1 PG (ref 26–34)
MCHC RBC AUTO-ENTMCNC: 33.3 G/DL (ref 31–37)
MCV RBC AUTO: 87.5 FL
NONHDLC SERPL-MCNC: 94 MG/DL (ref ?–130)
OSMOLALITY SERPL CALC.SUM OF ELEC: 298 MOSM/KG (ref 275–295)
PLATELET # BLD AUTO: 246 10(3)UL (ref 150–450)
POTASSIUM SERPL-SCNC: 4.3 MMOL/L (ref 3.5–5.1)
RBC # BLD AUTO: 5.19 X10(6)UL
SODIUM SERPL-SCNC: 142 MMOL/L (ref 136–145)
T3FREE SERPL-MCNC: 2.38 PG/ML (ref 2.4–4.2)
T4 FREE SERPL-MCNC: 1.1 NG/DL (ref 0.8–1.7)
TRIGL SERPL-MCNC: 171 MG/DL (ref 30–149)
TSI SER-ACNC: 0.16 MIU/ML (ref 0.36–3.74)
VLDLC SERPL CALC-MCNC: 26 MG/DL (ref 0–30)
WBC # BLD AUTO: 9.7 X10(3) UL (ref 4–11)

## 2022-07-21 PROCEDURE — 80048 BASIC METABOLIC PNL TOTAL CA: CPT | Performed by: FAMILY MEDICINE

## 2022-07-21 PROCEDURE — 80061 LIPID PANEL: CPT | Performed by: FAMILY MEDICINE

## 2022-07-21 PROCEDURE — 3074F SYST BP LT 130 MM HG: CPT | Performed by: FAMILY MEDICINE

## 2022-07-21 PROCEDURE — 84439 ASSAY OF FREE THYROXINE: CPT | Performed by: FAMILY MEDICINE

## 2022-07-21 PROCEDURE — 85027 COMPLETE CBC AUTOMATED: CPT | Performed by: FAMILY MEDICINE

## 2022-07-21 PROCEDURE — 3079F DIAST BP 80-89 MM HG: CPT | Performed by: FAMILY MEDICINE

## 2022-07-21 PROCEDURE — 84450 TRANSFERASE (AST) (SGOT): CPT | Performed by: FAMILY MEDICINE

## 2022-07-21 PROCEDURE — 83036 HEMOGLOBIN GLYCOSYLATED A1C: CPT | Performed by: FAMILY MEDICINE

## 2022-07-21 PROCEDURE — 99396 PREV VISIT EST AGE 40-64: CPT | Performed by: FAMILY MEDICINE

## 2022-07-21 PROCEDURE — 84443 ASSAY THYROID STIM HORMONE: CPT | Performed by: FAMILY MEDICINE

## 2022-07-21 PROCEDURE — 84481 FREE ASSAY (FT-3): CPT | Performed by: FAMILY MEDICINE

## 2022-07-21 PROCEDURE — 3008F BODY MASS INDEX DOCD: CPT | Performed by: FAMILY MEDICINE

## 2022-07-21 PROCEDURE — 84460 ALANINE AMINO (ALT) (SGPT): CPT | Performed by: FAMILY MEDICINE

## 2022-07-21 RX ORDER — PANTOPRAZOLE SODIUM 40 MG/1
40 TABLET, DELAYED RELEASE ORAL
Qty: 90 TABLET | Refills: 1 | Status: SHIPPED | OUTPATIENT
Start: 2022-07-21

## 2022-07-22 ENCOUNTER — PATIENT MESSAGE (OUTPATIENT)
Dept: FAMILY MEDICINE CLINIC | Facility: CLINIC | Age: 52
End: 2022-07-22

## 2022-07-22 LAB
EST. AVERAGE GLUCOSE BLD GHB EST-MCNC: 206 MG/DL (ref 68–126)
HBA1C MFR BLD: 8.8 % (ref ?–5.7)

## 2022-07-22 NOTE — TELEPHONE ENCOUNTER
From: Briana Mart  To: Yani Kiran MD  Sent: 7/22/2022 10:36 AM CDT  Subject: Diabetic?! Hi doc. This was not good news to get from [facility] this morning. Please send the metformin script to [facility] [place]. Once we have the dosage down pat, then we can go [facility].     Thank you,  Gabby

## 2022-07-23 NOTE — TELEPHONE ENCOUNTER
From: Jacinda Begin  To: Jaquelin Liz MD  Sent: 7/22/2022 6:50 PM CDT  Subject: Checking sugars? Hi doc. Do you want me checking my blood sugar? If so I will need a meter, test strip and lancet script sent to [facility].   Thank you,  Gabby

## 2022-07-27 ENCOUNTER — PATIENT MESSAGE (OUTPATIENT)
Dept: FAMILY MEDICINE CLINIC | Facility: CLINIC | Age: 52
End: 2022-07-27

## 2022-07-27 NOTE — TELEPHONE ENCOUNTER
From: Briana Mart  To: Yani Kiran MD  Sent: 7/27/2022 11:31 AM CDT  Subject: Covid    Hi doc. I just tested positive for covid. Pardeep Garcia did as well, but he just has a stuffy nose. I am down for the count. Fevers, chills, feel like my insides are short circuiting, like jittery. Any medicines out there now to make this go away sooner?

## 2022-09-14 ENCOUNTER — MED REC SCAN ONLY (OUTPATIENT)
Dept: FAMILY MEDICINE CLINIC | Facility: CLINIC | Age: 52
End: 2022-09-14

## 2022-09-22 ENCOUNTER — PATIENT MESSAGE (OUTPATIENT)
Dept: FAMILY MEDICINE CLINIC | Facility: CLINIC | Age: 52
End: 2022-09-22

## 2022-09-22 RX ORDER — ROSUVASTATIN CALCIUM 10 MG/1
10 TABLET, COATED ORAL NIGHTLY
Qty: 90 TABLET | Refills: 1 | Status: SHIPPED | OUTPATIENT
Start: 2022-09-22 | End: 2023-03-17

## 2022-09-22 NOTE — TELEPHONE ENCOUNTER
From: Timothy Kenney  To: Ketan Cueva MD  Sent: 9/22/2022 8:37 AM CDT  Subject: Refill rosuvastatin 10 mg    Good morning! [facility] a refill request for rosuvastatin. Please refill, and I will see you next week for my appointment.     Thank you,  Gabby

## 2022-09-30 ENCOUNTER — OFFICE VISIT (OUTPATIENT)
Dept: FAMILY MEDICINE CLINIC | Facility: CLINIC | Age: 52
End: 2022-09-30

## 2022-09-30 VITALS
DIASTOLIC BLOOD PRESSURE: 80 MMHG | HEART RATE: 84 BPM | OXYGEN SATURATION: 97 % | BODY MASS INDEX: 39 KG/M2 | WEIGHT: 226 LBS | TEMPERATURE: 97 F | SYSTOLIC BLOOD PRESSURE: 126 MMHG

## 2022-09-30 DIAGNOSIS — E11.9 TYPE 2 DIABETES MELLITUS WITHOUT COMPLICATION, WITHOUT LONG-TERM CURRENT USE OF INSULIN (HCC): Primary | ICD-10-CM

## 2022-09-30 LAB
CREAT UR-SCNC: 293 MG/DL
EST. AVERAGE GLUCOSE BLD GHB EST-MCNC: 160 MG/DL (ref 68–126)
HBA1C MFR BLD: 7.2 % (ref ?–5.7)
MICROALBUMIN UR-MCNC: 1.7 MG/DL
MICROALBUMIN/CREAT 24H UR-RTO: 5.8 UG/MG (ref ?–30)

## 2022-09-30 PROCEDURE — 83036 HEMOGLOBIN GLYCOSYLATED A1C: CPT | Performed by: FAMILY MEDICINE

## 2022-09-30 PROCEDURE — 82570 ASSAY OF URINE CREATININE: CPT | Performed by: FAMILY MEDICINE

## 2022-09-30 PROCEDURE — 99214 OFFICE O/P EST MOD 30 MIN: CPT | Performed by: FAMILY MEDICINE

## 2022-09-30 PROCEDURE — 3079F DIAST BP 80-89 MM HG: CPT | Performed by: FAMILY MEDICINE

## 2022-09-30 PROCEDURE — 3074F SYST BP LT 130 MM HG: CPT | Performed by: FAMILY MEDICINE

## 2022-09-30 PROCEDURE — 82043 UR ALBUMIN QUANTITATIVE: CPT | Performed by: FAMILY MEDICINE

## 2022-10-06 ENCOUNTER — TELEPHONE (OUTPATIENT)
Dept: FAMILY MEDICINE CLINIC | Facility: CLINIC | Age: 52
End: 2022-10-06

## 2022-10-26 RX ORDER — ALPRAZOLAM 0.5 MG/1
0.5 TABLET ORAL
Qty: 90 TABLET | Refills: 0 | Status: SHIPPED | OUTPATIENT
Start: 2022-10-26

## 2022-10-26 RX ORDER — LEVOTHYROXINE SODIUM 0.03 MG/1
25 TABLET ORAL
Qty: 90 TABLET | Refills: 2 | Status: SHIPPED | OUTPATIENT
Start: 2022-10-26

## 2022-12-16 ENCOUNTER — PATIENT MESSAGE (OUTPATIENT)
Dept: FAMILY MEDICINE CLINIC | Facility: CLINIC | Age: 52
End: 2022-12-16

## 2022-12-16 NOTE — TELEPHONE ENCOUNTER
From: Liya Castano  To: Gina Simpson MD  Sent: 12/16/2022 1:18 PM CST  Subject: Fasting labs? Hi doc. Scheduled my appointment to come see you on Dec 31 at 10 am. Are we going to do labs that I need to fast for?      Thanks,  Gabby

## 2022-12-19 ENCOUNTER — PATIENT MESSAGE (OUTPATIENT)
Dept: FAMILY MEDICINE CLINIC | Facility: CLINIC | Age: 52
End: 2022-12-19

## 2022-12-19 ENCOUNTER — TELEMEDICINE (OUTPATIENT)
Dept: FAMILY MEDICINE CLINIC | Facility: CLINIC | Age: 52
End: 2022-12-19
Payer: COMMERCIAL

## 2022-12-19 DIAGNOSIS — J31.0 PURULENT RHINITIS: Primary | ICD-10-CM

## 2022-12-19 RX ORDER — AMOXICILLIN AND CLAVULANATE POTASSIUM 500; 125 MG/1; MG/1
TABLET, FILM COATED ORAL
Qty: 20 TABLET | Refills: 0 | Status: SHIPPED | OUTPATIENT
Start: 2022-12-19 | End: 2022-12-29

## 2022-12-19 NOTE — TELEPHONE ENCOUNTER
From: Imani Whitman  To: Mata Crockett MD  Sent: 12/19/2022 6:44 AM CST  Subject: Sinus infection     Good morning doc. I have been down with some kind of yuck all weekend. I definitely have a sinus infection, as my face hurts, especially under my eyes on my cheek bones. It's in my chest as well, but the sinus infection is the worst of it. Would it be possible to send a script in to [facility] in [place]?    Thanks  Jose Parikh

## 2022-12-31 ENCOUNTER — OFFICE VISIT (OUTPATIENT)
Dept: FAMILY MEDICINE CLINIC | Facility: CLINIC | Age: 52
End: 2022-12-31
Payer: COMMERCIAL

## 2022-12-31 ENCOUNTER — PATIENT MESSAGE (OUTPATIENT)
Dept: FAMILY MEDICINE CLINIC | Facility: CLINIC | Age: 52
End: 2022-12-31

## 2022-12-31 VITALS
RESPIRATION RATE: 16 BRPM | SYSTOLIC BLOOD PRESSURE: 130 MMHG | WEIGHT: 226 LBS | HEART RATE: 103 BPM | DIASTOLIC BLOOD PRESSURE: 86 MMHG | TEMPERATURE: 97 F | BODY MASS INDEX: 38.58 KG/M2 | OXYGEN SATURATION: 96 % | HEIGHT: 64 IN

## 2022-12-31 DIAGNOSIS — E11.9 TYPE 2 DIABETES MELLITUS WITHOUT COMPLICATION, WITHOUT LONG-TERM CURRENT USE OF INSULIN (HCC): ICD-10-CM

## 2022-12-31 DIAGNOSIS — R61 DIAPHORESIS: Primary | ICD-10-CM

## 2022-12-31 LAB
EST. AVERAGE GLUCOSE BLD GHB EST-MCNC: 166 MG/DL (ref 68–126)
FSH SERPL-ACNC: 44.8 MIU/ML
HBA1C MFR BLD: 7.4 % (ref ?–5.7)
LH SERPL-ACNC: 20.6 MIU/ML

## 2022-12-31 PROCEDURE — 83001 ASSAY OF GONADOTROPIN (FSH): CPT | Performed by: FAMILY MEDICINE

## 2022-12-31 PROCEDURE — 3079F DIAST BP 80-89 MM HG: CPT | Performed by: FAMILY MEDICINE

## 2022-12-31 PROCEDURE — 83036 HEMOGLOBIN GLYCOSYLATED A1C: CPT | Performed by: FAMILY MEDICINE

## 2022-12-31 PROCEDURE — 83002 ASSAY OF GONADOTROPIN (LH): CPT | Performed by: FAMILY MEDICINE

## 2022-12-31 PROCEDURE — 3008F BODY MASS INDEX DOCD: CPT | Performed by: FAMILY MEDICINE

## 2022-12-31 PROCEDURE — 3075F SYST BP GE 130 - 139MM HG: CPT | Performed by: FAMILY MEDICINE

## 2022-12-31 PROCEDURE — 99214 OFFICE O/P EST MOD 30 MIN: CPT | Performed by: FAMILY MEDICINE

## 2022-12-31 RX ORDER — ESTRADIOL 1 MG/1
1 TABLET ORAL DAILY
Qty: 30 TABLET | Refills: 0 | Status: SHIPPED | OUTPATIENT
Start: 2022-12-31

## 2022-12-31 NOTE — TELEPHONE ENCOUNTER
From: Elia Lucas  To: Jazzmine Hurst MD  Sent: 12/31/2022 11:09 AM CST  Subject: Estrogen    Hi doc. My insurance is not covering what you prescribed. Almost $300 a mo th. Pharmacist said to ask you about estrodol? Let me know.    Thank you,  Gabby

## 2023-01-03 ENCOUNTER — TELEPHONE (OUTPATIENT)
Dept: FAMILY MEDICINE CLINIC | Facility: CLINIC | Age: 53
End: 2023-01-03

## 2023-01-03 NOTE — TELEPHONE ENCOUNTER
Please let patient or caregiver know or leave message that:   I [facility] the generic [facility]. It appears a prior authorization has been generated for the medication. We will see if it's approved.   Thanks

## 2023-01-03 NOTE — TELEPHONE ENCOUNTER
----- Message from Shanti Allen RN sent at 1/3/2023 10:02 AM CST -----  Patient's name and  verified   Patient would like to start the Ozempic or Trulicity.    Patient would like to know if they stop the Metformin while taking Ozempic or Trulicity  Patient notified and verbalized an understanding

## 2023-01-03 NOTE — TELEPHONE ENCOUNTER
Patient's name and  verified   Prior Authorization started in another TE  Patient notified and verbalized an understanding

## 2023-01-06 NOTE — TELEPHONE ENCOUNTER
Spoke to patient stated that semaglutide (Ozempic) was sent to [facility] and they have a shortage would like it sent [facility].        Medication pended, sign if appropriate

## 2023-01-09 ENCOUNTER — PATIENT MESSAGE (OUTPATIENT)
Dept: FAMILY MEDICINE CLINIC | Facility: CLINIC | Age: 53
End: 2023-01-09

## 2023-01-09 NOTE — TELEPHONE ENCOUNTER
From: Oni Fisher  To: Toney Purcell MD  Sent: 1/9/2023 11:08 AM CST  Subject: Brandon Gaytan doc. Apparently ozempic is also on a nationwide shortage. [facility] and they said they have it and they sent a request to you. I received an email today saying they have not heard back from you yet.     Thank you,  Gabby

## 2023-01-13 ENCOUNTER — PATIENT MESSAGE (OUTPATIENT)
Dept: FAMILY MEDICINE CLINIC | Facility: CLINIC | Age: 53
End: 2023-01-13

## 2023-01-13 NOTE — TELEPHONE ENCOUNTER
From: Jacquelin Zamarripa  Sent: 1/13/2023 9:53 AM CST  To: Holly Damon Clinical Staff  Subject: Jerilyn Mate like the ozempic will be delivered today. So it's a once a week shot, correct? Is it better to do it in the morning or evening or it doesn't matter?      Thanks,  Gabby

## 2023-01-29 ENCOUNTER — PATIENT MESSAGE (OUTPATIENT)
Dept: FAMILY MEDICINE CLINIC | Facility: CLINIC | Age: 53
End: 2023-01-29

## 2023-01-30 ENCOUNTER — PATIENT MESSAGE (OUTPATIENT)
Dept: FAMILY MEDICINE CLINIC | Facility: CLINIC | Age: 53
End: 2023-01-30

## 2023-01-30 RX ORDER — ESTRADIOL 1 MG/1
1 TABLET ORAL DAILY
Qty: 90 TABLET | Refills: 2 | Status: SHIPPED | OUTPATIENT
Start: 2023-01-30

## 2023-01-30 NOTE — TELEPHONE ENCOUNTER
From: Carmen Dutta  To: Liliana Serrato MD  Sent: 1/30/2023 1:32 PM CST  Subject: Estradiol    Hi doc. Can you send a 90 day script for this to [facility]? Thank you.     Gabby

## 2023-01-30 NOTE — TELEPHONE ENCOUNTER
From: Kaylee Felty  Sent: 1/29/2023 6:35 PM CST  To: Franko Kamara Clinical Staff  Subject: Ozempic    Hi doc,  When should I be following up with you since starting the ozempic? I just took my 3rd injection and am not liking these side affects. Let me know when I should schedule a followup.     Thank you,  Gabby

## 2023-02-07 ENCOUNTER — OFFICE VISIT (OUTPATIENT)
Dept: FAMILY MEDICINE CLINIC | Facility: CLINIC | Age: 53
End: 2023-02-07
Payer: COMMERCIAL

## 2023-02-07 VITALS
RESPIRATION RATE: 16 BRPM | BODY MASS INDEX: 38 KG/M2 | HEART RATE: 66 BPM | OXYGEN SATURATION: 97 % | DIASTOLIC BLOOD PRESSURE: 80 MMHG | WEIGHT: 222.38 LBS | TEMPERATURE: 97 F | SYSTOLIC BLOOD PRESSURE: 136 MMHG

## 2023-02-07 DIAGNOSIS — R10.816 EPIGASTRIC ABDOMINAL TENDERNESS, REBOUND TENDERNESS PRESENCE NOT SPECIFIED: ICD-10-CM

## 2023-02-07 DIAGNOSIS — R19.5 LOOSE STOOLS: ICD-10-CM

## 2023-02-07 DIAGNOSIS — E11.9 TYPE 2 DIABETES MELLITUS WITHOUT COMPLICATION, WITHOUT LONG-TERM CURRENT USE OF INSULIN (HCC): Primary | ICD-10-CM

## 2023-02-07 DIAGNOSIS — F41.9 ANXIETY: ICD-10-CM

## 2023-02-07 LAB — AMYLASE SERPL-CCNC: 37 U/L (ref 25–115)

## 2023-02-07 PROCEDURE — 3075F SYST BP GE 130 - 139MM HG: CPT | Performed by: FAMILY MEDICINE

## 2023-02-07 PROCEDURE — 3079F DIAST BP 80-89 MM HG: CPT | Performed by: FAMILY MEDICINE

## 2023-02-07 PROCEDURE — 82150 ASSAY OF AMYLASE: CPT | Performed by: FAMILY MEDICINE

## 2023-02-07 PROCEDURE — 99214 OFFICE O/P EST MOD 30 MIN: CPT | Performed by: FAMILY MEDICINE

## 2023-02-07 RX ORDER — ALPRAZOLAM 0.5 MG/1
0.5 TABLET ORAL 2 TIMES DAILY PRN
Qty: 180 TABLET | Refills: 0 | Status: SHIPPED | OUTPATIENT
Start: 2023-02-07

## 2023-02-20 ENCOUNTER — PATIENT MESSAGE (OUTPATIENT)
Dept: FAMILY MEDICINE CLINIC | Facility: CLINIC | Age: 53
End: 2023-02-20

## 2023-02-20 NOTE — TELEPHONE ENCOUNTER
From: Danay Amin  To: Janet Stephens MD  Sent: 2/20/2023 1:23 PM CST  Subject: Ozempic    Hi doc. I just took my last dose of ozempic yesterday. Can you send a refill in [facility] for the next higher dose?    Thank you,  Gabby

## 2023-02-21 RX ORDER — SEMAGLUTIDE 0.68 MG/ML
0.5 INJECTION, SOLUTION SUBCUTANEOUS WEEKLY
Qty: 12 ML | Refills: 0 | Status: SHIPPED | OUTPATIENT
Start: 2023-02-21

## 2023-03-17 RX ORDER — ROSUVASTATIN CALCIUM 10 MG/1
TABLET, COATED ORAL
Qty: 90 TABLET | Refills: 2 | Status: SHIPPED | OUTPATIENT
Start: 2023-03-17

## 2023-04-02 ENCOUNTER — PATIENT MESSAGE (OUTPATIENT)
Dept: FAMILY MEDICINE CLINIC | Facility: CLINIC | Age: 53
End: 2023-04-02

## 2023-04-03 RX ORDER — DULOXETIN HYDROCHLORIDE 60 MG/1
60 CAPSULE, DELAYED RELEASE ORAL DAILY
Qty: 90 CAPSULE | Refills: 0 | Status: SHIPPED | OUTPATIENT
Start: 2023-04-03

## 2023-04-03 RX ORDER — ROSUVASTATIN CALCIUM 10 MG/1
10 TABLET, COATED ORAL NIGHTLY
Qty: 90 TABLET | Refills: 0 | Status: SHIPPED | OUTPATIENT
Start: 2023-04-03

## 2023-04-03 RX ORDER — ROSUVASTATIN CALCIUM 10 MG/1
10 TABLET, COATED ORAL NIGHTLY
Qty: 90 TABLET | Refills: 0 | Status: SHIPPED | OUTPATIENT
Start: 2023-04-03 | End: 2023-04-03

## 2023-04-03 RX ORDER — DULOXETIN HYDROCHLORIDE 60 MG/1
60 CAPSULE, DELAYED RELEASE ORAL DAILY
Qty: 90 CAPSULE | Refills: 0 | Status: SHIPPED | OUTPATIENT
Start: 2023-04-03 | End: 2023-04-03

## 2023-04-03 NOTE — TELEPHONE ENCOUNTER
From: Frankie Latham  To: Taniya Dejesus MD  Sent: 4/2/2023 11:32 AM CDT  Subject: Refills    Hi Doc! I am in need of refills for Duloxetine 60 mg and Rosuvastatin 10 MG. Can you please send them to [facility] in [facility]?  The address is [address] and their phone number is [phone]    Thank you,  Keon Hines

## 2024-02-01 ENCOUNTER — TELEPHONE (OUTPATIENT)
Dept: FAMILY MEDICINE CLINIC | Facility: CLINIC | Age: 54
End: 2024-02-01

## 2024-02-01 NOTE — TELEPHONE ENCOUNTER
Received fax from [facility] in [place] requesting pt mammo images to be copied to CD and mailed to their office. Sent fax back to office with MARIALUISA for pt to fill out and send back to process request.

## 2024-06-06 NOTE — TELEPHONE ENCOUNTER
From: Antoine Cruz  To: Yohana Campos MD  Sent: 1/21/2017 8:15 AM CST  Subject: Medication Renewal Request    Original authorizing provider: MD Antoine Cuevas would like a refill of the following medications:  Levothyroxine Sodium (SY No qualified resident/fellow available to assist

## (undated) NOTE — LETTER
02/05/21        Donnamaria Shoulders  [address]      Dear Areli Brannon,    Our records indicate that you have outstanding lab work and or testing that was ordered for you and has not yet been completed:  Orders Placed This Encounter      CT

## (undated) NOTE — MR AVS SNAPSHOT
[address] [facility] [id number] [phone]               Thank you for choosing us for your [facility].   We are glad to serve you and happy to provide you with this discharge instructions in [employer] by going to Visits < Admission Summaries. If you've been to the Emergency Department or your doctor's office, you can view your past visit information in [employer] by going to Visits < Visit Summaries. [employer] questions?

## (undated) NOTE — MR AVS SNAPSHOT
[address] [facility] [id number] [phone]               Thank you for choosing us for your health care visit with Nikia Bridges MD.  We are glad to serve you and happy to provide you with this link severity, unspecified obesity type [E66.9]           ZINC, PLASMA OR SERUM [945][Q]    Complete by: May 01, 2017 (Approximate)    Assoc Dx:   Well adult exam [Z00.00], Obesity, unspecified obesity severity, unspecified obesity type [E66.9]           Folate Wellbutrin Xl [Budeprion Xl]     Emotional alteration                Today's Vital Signs     BP Pulse Temp Weight          98/70 mmHg 66 97.3 °F (36.3 °C) (Temporal) 182 lb 3.2 oz           Current Medications          This list is accurate as of: 5/1/17 Don’t eat while distracted and slow down. Avoid over sized portions. Don’t eat while when you’re bored.      EAT THESE FOODS MORE OFTEN: EAT THESE FOODS LESS OFTEN:   Make half your plate fruits and vegetables Highly refined, white starches including wh

## (undated) NOTE — LETTER
01/17/19        [address]      Dear Dorys Martinez,    Our records indicate that you have outstanding lab work and or testing that was ordered for you and has not yet been completed:  Lab Frequency Next Occurrence   JACQUELYN

## (undated) NOTE — Clinical Note
Hi Dr. Rosemary Moncada attached my initial endocrine consult note for our shared pt. She mainly wanted to focus on the hirsutism. For her hypoTH, I recommended to stop the Cytomel to see if that'll normalize the TSH.  She said she's happy with your management of her thyroid, so I told her I'll just provide my recommendations in the note and route to you.   -Mary Gallegos

## (undated) NOTE — Clinical Note
Rivkajoel Crandall saw Elvin Cantor in the office today. She presents with recurrent abdominal wall mass with pain. I suspect this patient may be having a recurrent hernia at one of her trocar sites from her prior sleeve gastrectomy.   I am unable to demonstrate a hernia t